# Patient Record
Sex: FEMALE | Race: WHITE | NOT HISPANIC OR LATINO | Employment: FULL TIME | URBAN - METROPOLITAN AREA
[De-identification: names, ages, dates, MRNs, and addresses within clinical notes are randomized per-mention and may not be internally consistent; named-entity substitution may affect disease eponyms.]

---
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
## 2019-05-29 ENCOUNTER — HOSPITAL ENCOUNTER (OUTPATIENT)
Dept: HOSPITAL 8 - RAD | Age: 19
Discharge: HOME | End: 2019-05-29
Attending: PHYSICIAN ASSISTANT
Payer: COMMERCIAL

## 2019-05-29 DIAGNOSIS — R50.9: ICD-10-CM

## 2019-05-29 DIAGNOSIS — R10.11: Primary | ICD-10-CM

## 2019-05-29 DIAGNOSIS — R10.13: ICD-10-CM

## 2019-05-29 PROCEDURE — 76700 US EXAM ABDOM COMPLETE: CPT

## 2020-12-28 ENCOUNTER — OFFICE VISIT (OUTPATIENT)
Dept: URGENT CARE | Facility: PHYSICIAN GROUP | Age: 20
End: 2020-12-28
Payer: COMMERCIAL

## 2020-12-28 ENCOUNTER — HOSPITAL ENCOUNTER (OUTPATIENT)
Facility: MEDICAL CENTER | Age: 20
End: 2020-12-28
Attending: PHYSICIAN ASSISTANT
Payer: COMMERCIAL

## 2020-12-28 VITALS
TEMPERATURE: 99 F | OXYGEN SATURATION: 98 % | HEIGHT: 65 IN | SYSTOLIC BLOOD PRESSURE: 112 MMHG | HEART RATE: 95 BPM | RESPIRATION RATE: 16 BRPM | DIASTOLIC BLOOD PRESSURE: 70 MMHG | BODY MASS INDEX: 28.99 KG/M2 | WEIGHT: 174 LBS

## 2020-12-28 DIAGNOSIS — J06.9 UPPER RESPIRATORY TRACT INFECTION, UNSPECIFIED TYPE: ICD-10-CM

## 2020-12-28 LAB
HETEROPH AB SER QL LA: NORMAL
INT CON NEG: NEGATIVE
INT CON NEG: NEGATIVE
INT CON POS: POSITIVE
INT CON POS: POSITIVE
S PYO AG THROAT QL: NORMAL

## 2020-12-28 PROCEDURE — 87880 STREP A ASSAY W/OPTIC: CPT | Performed by: PHYSICIAN ASSISTANT

## 2020-12-28 PROCEDURE — U0003 INFECTIOUS AGENT DETECTION BY NUCLEIC ACID (DNA OR RNA); SEVERE ACUTE RESPIRATORY SYNDROME CORONAVIRUS 2 (SARS-COV-2) (CORONAVIRUS DISEASE [COVID-19]), AMPLIFIED PROBE TECHNIQUE, MAKING USE OF HIGH THROUGHPUT TECHNOLOGIES AS DESCRIBED BY CMS-2020-01-R: HCPCS

## 2020-12-28 PROCEDURE — 86308 HETEROPHILE ANTIBODY SCREEN: CPT | Performed by: PHYSICIAN ASSISTANT

## 2020-12-28 PROCEDURE — 99214 OFFICE O/P EST MOD 30 MIN: CPT | Performed by: PHYSICIAN ASSISTANT

## 2020-12-28 RX ORDER — ESCITALOPRAM OXALATE 10 MG/1
10 TABLET ORAL DAILY
COMMUNITY
End: 2021-04-06

## 2020-12-28 ASSESSMENT — ENCOUNTER SYMPTOMS
MYALGIAS: 0
SWEATS: 0
COUGH: 1
DIARRHEA: 0
SENSORY CHANGE: 0
RHINORRHEA: 1
SINUS PAIN: 0
FOCAL WEAKNESS: 0
HEMOPTYSIS: 0
VOMITING: 1
FEVER: 1
PALPITATIONS: 0
ABDOMINAL PAIN: 0
HEADACHES: 0
TINGLING: 0
SHORTNESS OF BREATH: 0
WHEEZING: 0
SPUTUM PRODUCTION: 0
CHILLS: 1
NAUSEA: 1
SORE THROAT: 1

## 2020-12-28 NOTE — PROGRESS NOTES
"Subjective:      Zhanna Edmondson is a 20 y.o. female who presents with Sore Throat (runny nose, sneezing coughing x5 days)            Cough  This is a new problem. The current episode started in the past 7 days (5 days). The problem has been unchanged. The cough is non-productive. Associated symptoms include chills, a fever (low-grade), nasal congestion, rhinorrhea and a sore throat. Pertinent negatives include no chest pain, ear congestion, ear pain, headaches, hemoptysis, myalgias, postnasal drip, rash, shortness of breath, sweats or wheezing. She has tried nothing for the symptoms. There is no history of asthma or pneumonia.       No past medical history on file.    No past surgical history on file.      No family history on file.    No Known Allergies    Medications, Allergies, and current problem list reviewed today in Epic      Review of Systems   Constitutional: Positive for chills, fever (low-grade) and malaise/fatigue.   HENT: Positive for congestion, rhinorrhea and sore throat. Negative for ear discharge, ear pain, postnasal drip and sinus pain.    Respiratory: Positive for cough. Negative for hemoptysis, sputum production, shortness of breath and wheezing.    Cardiovascular: Negative for chest pain, palpitations and leg swelling.   Gastrointestinal: Positive for nausea and vomiting. Negative for abdominal pain and diarrhea.   Musculoskeletal: Negative for myalgias.   Skin: Negative for rash.   Neurological: Negative for tingling, sensory change, focal weakness and headaches.     All other systems reviewed and are negative.        Objective:     /70 (BP Location: Left arm, Patient Position: Sitting, BP Cuff Size: Adult)   Pulse 95   Temp 37.2 °C (99 °F) (Temporal)   Resp 16   Ht 1.651 m (5' 5\")   Wt 78.9 kg (174 lb)   LMP 12/07/2020   SpO2 98%   BMI 28.96 kg/m²      Physical Exam  Constitutional:       General: She is not in acute distress.     Appearance: She is not ill-appearing.   HENT:     "  Head: Normocephalic and atraumatic.      Nose: Congestion and rhinorrhea present.      Mouth/Throat:      Mouth: Mucous membranes are moist.      Pharynx: Oropharyngeal exudate and posterior oropharyngeal erythema present.   Eyes:      Conjunctiva/sclera: Conjunctivae normal.   Cardiovascular:      Rate and Rhythm: Normal rate and regular rhythm.      Heart sounds: Normal heart sounds. No murmur. No friction rub. No gallop.    Pulmonary:      Effort: Pulmonary effort is normal. No respiratory distress.      Breath sounds: Normal breath sounds. No wheezing, rhonchi or rales.   Musculoskeletal: Normal range of motion.   Lymphadenopathy:      Cervical: Cervical adenopathy present.   Skin:     General: Skin is warm and dry.      Findings: No rash.   Neurological:      General: No focal deficit present.      Mental Status: She is alert and oriented to person, place, and time.   Psychiatric:         Mood and Affect: Mood normal.         Behavior: Behavior normal.         Thought Content: Thought content normal.         Judgment: Judgment normal.                 Assessment/Plan:        1. Upper respiratory tract infection, unspecified type  COVID/SARS COV-2 PCR    POCT Rapid Strep A    POCT Mononucleosis (mono)       poct strep-negative  poct mono- negative     COVID pending.   Isolation guidelines, conservative measures and ER precautions discussed.   COVID handout given.    Differential diagnoses, Supportive care, and indications for immediate follow-up discussed with patient.   Pathogenesis of diagnosis discussed including typical length and natural progression.   Instructed to return to clinic or nearest emergency department for any change in condition, further concerns, or worsening of symptoms.    The patient demonstrated a good understanding and agreed with the treatment plan.    Ashlee Walker P.A.-C.

## 2020-12-29 DIAGNOSIS — J06.9 UPPER RESPIRATORY TRACT INFECTION, UNSPECIFIED TYPE: ICD-10-CM

## 2020-12-29 LAB
COVID ORDER STATUS COVID19: NORMAL
SARS-COV-2 RNA RESP QL NAA+PROBE: NOTDETECTED
SPECIMEN SOURCE: NORMAL

## 2020-12-31 ENCOUNTER — HOSPITAL ENCOUNTER (OUTPATIENT)
Facility: MEDICAL CENTER | Age: 20
End: 2020-12-31
Attending: PHYSICIAN ASSISTANT
Payer: COMMERCIAL

## 2020-12-31 ENCOUNTER — OFFICE VISIT (OUTPATIENT)
Dept: URGENT CARE | Facility: PHYSICIAN GROUP | Age: 20
End: 2020-12-31
Payer: COMMERCIAL

## 2020-12-31 VITALS
WEIGHT: 170 LBS | SYSTOLIC BLOOD PRESSURE: 120 MMHG | TEMPERATURE: 98.6 F | RESPIRATION RATE: 16 BRPM | OXYGEN SATURATION: 97 % | HEART RATE: 94 BPM | DIASTOLIC BLOOD PRESSURE: 80 MMHG | HEIGHT: 65 IN | BODY MASS INDEX: 28.32 KG/M2

## 2020-12-31 DIAGNOSIS — R50.9 FEVER, UNSPECIFIED FEVER CAUSE: ICD-10-CM

## 2020-12-31 DIAGNOSIS — I88.9 CERVICAL ADENITIS: ICD-10-CM

## 2020-12-31 DIAGNOSIS — J02.9 SORE THROAT: ICD-10-CM

## 2020-12-31 DIAGNOSIS — J03.90 EXUDATIVE TONSILLITIS: Primary | ICD-10-CM

## 2020-12-31 LAB
HETEROPH AB SER QL LA: NEGATIVE
INT CON NEG: NEGATIVE
INT CON NEG: NEGATIVE
INT CON POS: POSITIVE
INT CON POS: POSITIVE
S PYO AG THROAT QL: NEGATIVE

## 2020-12-31 PROCEDURE — 87077 CULTURE AEROBIC IDENTIFY: CPT

## 2020-12-31 PROCEDURE — 87070 CULTURE OTHR SPECIMN AEROBIC: CPT

## 2020-12-31 PROCEDURE — 86308 HETEROPHILE ANTIBODY SCREEN: CPT | Performed by: PHYSICIAN ASSISTANT

## 2020-12-31 PROCEDURE — 99214 OFFICE O/P EST MOD 30 MIN: CPT | Performed by: PHYSICIAN ASSISTANT

## 2020-12-31 PROCEDURE — 87880 STREP A ASSAY W/OPTIC: CPT | Performed by: PHYSICIAN ASSISTANT

## 2020-12-31 RX ORDER — AMOXICILLIN 875 MG/1
875 TABLET, COATED ORAL 2 TIMES DAILY
Qty: 20 TAB | Refills: 0 | Status: SHIPPED | OUTPATIENT
Start: 2020-12-31 | End: 2021-03-03

## 2020-12-31 RX ORDER — ACETAMINOPHEN 325 MG/1
650 TABLET ORAL EVERY 4 HOURS PRN
COMMUNITY
End: 2021-02-03

## 2020-12-31 ASSESSMENT — ENCOUNTER SYMPTOMS
COUGH: 0
TROUBLE SWALLOWING: 1
EYE DISCHARGE: 0
DIARRHEA: 0
NAUSEA: 0
VOMITING: 0
SWOLLEN GLANDS: 1
MUSCULOSKELETAL NEGATIVE: 1
STRIDOR: 0
CHILLS: 0
FEVER: 1
SORE THROAT: 1
HEADACHES: 0
DIZZINESS: 0
SHORTNESS OF BREATH: 0

## 2020-12-31 NOTE — LETTER
December 31, 2020         Patient: Zhanna Edmondson   YOB: 2000   Date of Visit: 12/31/2020           To Whom it May Concern:    Zhanna Edmondson was seen in my clinic on 12/31/2020. She {Return to school/sport/work:75651}    If you have any questions or concerns, please don't hesitate to call.        Sincerely,           Zeynep Flores P.A.-C.  Electronically Signed

## 2020-12-31 NOTE — PROGRESS NOTES
Subjective:      Zhanna Edmondson is a 20 y.o. female who presents with Pharyngitis (Fever, hard to swallow )    Medications:    • acetaminophen Tabs  • escitalopram Tabs    Allergies: Patient has no known allergies.    Problem List: Zhanna Edmondson does not have a problem list on file.    Surgical History:  No past surgical history on file.    Past Social Hx: Zhanna Edmondson  reports that she has never smoked. She has never used smokeless tobacco.     Past Family Hx:  Zhanna Edmondson family history is not on file. Not pertinent to today's visit.       Problem list, medications, and allergies reviewed by myself today in Epic.             Patient presents with:  Pharyngitis: Fever, hard to swallow .  Patient complains of worsening tonsil swelling, increase in white pus on tonsils bilaterally.  Patient states fever T-max 102 yesterday.  Patient had a negative Covid negative strep negative mono a few days ago but states she was told to come back if her symptoms got worse which they are.  Patient denies rash, nausea vomiting or diarrhea.  Patient is taking over-the-counter NSAIDs and gargling with salt water with a little relief.    Negative covid test 12/28/2020.    Pharyngitis   This is a new problem. The current episode started in the past 7 days. The problem has been gradually worsening. Neither side of throat is experiencing more pain than the other. The maximum temperature recorded prior to her arrival was 102 - 102.9 F. The fever has been present for 5 days or more. The pain is at a severity of 7/10. Associated symptoms include a plugged ear sensation, swollen glands and trouble swallowing. Pertinent negatives include no congestion, coughing, diarrhea, drooling, headaches, shortness of breath, stridor or vomiting. She has tried cool liquids, gargles and NSAIDs for the symptoms. The treatment provided mild relief.       Review of Systems   Constitutional: Positive for fever and malaise/fatigue. Negative for chills.  "  HENT: Positive for sore throat and trouble swallowing. Negative for congestion and drooling.    Eyes: Negative for discharge.   Respiratory: Negative for cough, shortness of breath and stridor.    Cardiovascular: Negative for chest pain.   Gastrointestinal: Negative for diarrhea, nausea and vomiting.   Musculoskeletal: Negative.    Skin: Negative for rash.   Neurological: Negative for dizziness and headaches.   All other systems reviewed and are negative.         Objective:     /80 (BP Location: Right arm, Patient Position: Sitting, BP Cuff Size: Adult)   Pulse 94   Temp 37 °C (98.6 °F) (Temporal)   Resp 16   Ht 1.651 m (5' 5\")   Wt 77.1 kg (170 lb)   LMP 12/07/2020   SpO2 97%   BMI 28.29 kg/m²      Physical Exam  Vitals signs and nursing note reviewed.   Constitutional:       General: She is not in acute distress.     Appearance: Normal appearance. She is well-developed and normal weight. She is not ill-appearing or toxic-appearing.   HENT:      Head: Normocephalic and atraumatic.      Right Ear: Tympanic membrane normal.      Left Ear: Tympanic membrane normal.      Nose: Nose normal.      Mouth/Throat:      Lips: Pink.      Mouth: Mucous membranes are moist.      Pharynx: Uvula midline. Posterior oropharyngeal erythema present. No uvula swelling.      Tonsils: Tonsillar exudate present. 3+ on the right. 3+ on the left.   Eyes:      Extraocular Movements: Extraocular movements intact.      Conjunctiva/sclera: Conjunctivae normal.      Pupils: Pupils are equal, round, and reactive to light.   Neck:      Musculoskeletal: Normal range of motion and neck supple.   Cardiovascular:      Rate and Rhythm: Normal rate and regular rhythm.      Heart sounds: Normal heart sounds.   Pulmonary:      Effort: Pulmonary effort is normal.      Breath sounds: Normal breath sounds.   Abdominal:      Palpations: Abdomen is soft.   Musculoskeletal: Normal range of motion.   Lymphadenopathy:      Cervical: No cervical " adenopathy.   Skin:     General: Skin is warm and dry.      Capillary Refill: Capillary refill takes less than 2 seconds.   Neurological:      General: No focal deficit present.      Mental Status: She is alert and oriented to person, place, and time.      Gait: Gait normal.   Psychiatric:         Mood and Affect: Mood normal.         Behavior: Behavior is cooperative.            Strep: Negative    Mono: Negative     Assessment/Plan:           1. Exudative tonsillitis  amoxicillin (AMOXIL) 875 MG tablet   2. Fever, unspecified fever cause  CULTURE THROAT    POCT Mononucleosis (mono)    amoxicillin (AMOXIL) 875 MG tablet   3. Sore throat  POCT Rapid Strep A    CULTURE THROAT    POCT Mononucleosis (mono)    amoxicillin (AMOXIL) 875 MG tablet   4. Cervical adenitis  amoxicillin (AMOXIL) 875 MG tablet     Patient was evaluated in clinic today while wearing appropriate personal protective equipment.      PT can begin or continue OTC medications, increase fluids and rest until symptoms improve.     PT to begin medications today as prescribed.  We will treat for exudative tonsillitis, as well as cervical adenitis.  Patient has had persistent swelling of tonsils with exudate and fever, negative strep and mono.    For this reason I am running a throat culture to look for group B C or G strep.    PT should follow up with PCP in 1-2 days for re-evaluation if symptoms have not improved.  Discussed red flags and reasons to return to UC or ED.  Pt and/or family verbalized understanding of diagnosis and follow up instructions and was offered informational handout on diagnosis.  PT discharged.

## 2020-12-31 NOTE — LETTER
December 31, 2020         Patient: Zhanna Edmondson   YOB: 2000   Date of Visit: 12/31/2020           To Whom it May Concern:    Zhanna Edmondson was seen in my clinic on 12/31/2020. She may return to work on 01/04/2021.     If you have any questions or concerns, please don't hesitate to call.        Sincerely,           Zeynep Flores P.A.-C.  Electronically Signed

## 2021-01-03 LAB
BACTERIA SPEC RESP CULT: ABNORMAL
BACTERIA SPEC RESP CULT: ABNORMAL
SIGNIFICANT IND 70042: ABNORMAL
SITE SITE: ABNORMAL
SOURCE SOURCE: ABNORMAL

## 2021-02-03 ENCOUNTER — HOSPITAL ENCOUNTER (EMERGENCY)
Facility: MEDICAL CENTER | Age: 21
End: 2021-02-03
Attending: EMERGENCY MEDICINE
Payer: COMMERCIAL

## 2021-02-03 VITALS
DIASTOLIC BLOOD PRESSURE: 82 MMHG | RESPIRATION RATE: 16 BRPM | HEIGHT: 65 IN | HEART RATE: 84 BPM | TEMPERATURE: 97.2 F | OXYGEN SATURATION: 99 % | SYSTOLIC BLOOD PRESSURE: 118 MMHG | WEIGHT: 189.38 LBS | BODY MASS INDEX: 31.55 KG/M2

## 2021-02-03 DIAGNOSIS — S61.452A DOG BITE OF LEFT HAND, INITIAL ENCOUNTER: ICD-10-CM
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
## 2021-02-03 PROCEDURE — 90715 TDAP VACCINE 7 YRS/> IM: CPT | Performed by: EMERGENCY MEDICINE

## 2021-02-03 PROCEDURE — 700111 HCHG RX REV CODE 636 W/ 250 OVERRIDE (IP): Performed by: EMERGENCY MEDICINE

## 2021-02-03 PROCEDURE — 90471 IMMUNIZATION ADMIN: CPT

## 2021-02-03 PROCEDURE — 99283 EMERGENCY DEPT VISIT LOW MDM: CPT

## 2021-02-03 RX ORDER — AMOXICILLIN AND CLAVULANATE POTASSIUM 875; 125 MG/1; MG/1
1 TABLET, FILM COATED ORAL 2 TIMES DAILY
Qty: 14 TAB | Refills: 0 | Status: SHIPPED | OUTPATIENT
Start: 2021-02-03 | End: 2021-02-10

## 2021-02-03 RX ORDER — IBUPROFEN 200 MG
400 TABLET ORAL EVERY 6 HOURS PRN
COMMUNITY
End: 2021-03-03

## 2021-02-03 RX ADMIN — CLOSTRIDIUM TETANI TOXOID ANTIGEN (FORMALDEHYDE INACTIVATED), CORYNEBACTERIUM DIPHTHERIAE TOXOID ANTIGEN (FORMALDEHYDE INACTIVATED), BORDETELLA PERTUSSIS TOXOID ANTIGEN (GLUTARALDEHYDE INACTIVATED), BORDETELLA PERTUSSIS FILAMENTOUS HEMAGGLUTININ ANTIGEN (FORMALDEHYDE INACTIVATED), BORDETELLA PERTUSSIS PERTACTIN ANTIGEN, AND BORDETELLA PERTUSSIS FIMBRIAE 2/3 ANTIGEN 0.5 ML: 5; 2; 2.5; 5; 3; 5 INJECTION, SUSPENSION INTRAMUSCULAR at 10:28

## 2021-02-03 SDOH — HEALTH STABILITY: MENTAL HEALTH: HOW OFTEN DO YOU HAVE A DRINK CONTAINING ALCOHOL?: MONTHLY OR LESS

## 2021-02-03 NOTE — ED NOTES
ERP at bedside. Pt agrees with plan of care discussed by ERP. AIDET acknowledged with patient. Regino in low position, side rail up for pt safety. Call light within reach. Will continue to monitor.

## 2021-02-03 NOTE — LETTER
"  FORM C-4:  EMPLOYEE’S CLAIM FOR COMPENSATION/ REPORT OF INITIAL TREATMENT  EMPLOYEE’S CLAIM - PROVIDE ALL INFORMATION REQUESTED   First Name Zhanna Last Name Delvis Birthdate 2000  Sex female Claim Number   Home Address [address]             [place] [address]                                   Age  20 y.o. Height  1.651 m (5' 5\") Weight  85.9 kg (189 lb 6 oz) [facility]  [id number]   Mailing Address [address]              Zip [address] Telephone  [phone] (home)  Primary Language Spoken   Insurer  *** Third Party   [employer] Employee's Occupation (Job Title) When Injury or Occupational Disease Occurred     Employer's Name  Telephone     Employer Address  City  State  Zip    Date of Injury         Hour of Injury   Date Employer Notified   Last Day of Work after Injury or Occupational Disease   Supervisor to Whom Injury Reported     Address or Location of Accident (if applicable) [[address]]   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 (if applicable)     How did this injury or occupational disease occur? Be specific and answer in detail. Use additional sheet if necessary)     If you believe that you have an occupational disease, when did you first have knowledge of the disability and it relationship to your employment?  Witnesses to the Accident     Nature of Injury or Occupational Disease   Part(s) of Body Injured or Affected  , ,     I CERTIFY THAT THE ABOVE IS TRUE AND CORRECT TO THE BEST OF MY KNOWLEDGE AND THAT I HAVE PROVIDED THIS INFORMATION IN ORDER TO OBTAIN THE B[place]EF[place] OF [place]).  I HEREBY AUTHORIZE ANY PHYSICIAN, CHIROPRACTOR, SURGEON, PRACTITIONER, OR OTHER PERSON, ANY HOSPITAL, INCLUDING [facility] OR [facility], ANY MEDICAL SERVICE ORGANIZATION, ANY INSURANCE COMPANY, OR OTHER INSTITUTION OR " ORGANIZATION TO RELEASE TO EACH OTHER, ANY MEDICAL OR OTHER INFORMATION, INCLUDING BENEFITS PAID OR PAYABLE, PERTINENT TO THIS INJURY OR DISEASE, EXCEPT INFORMATION RELATIVE TO DIAGNOSIS, TREATMENT AND/OR COUNSELING FOR AIDS, PSYCHOLOGICAL CONDITIONS, ALCOHOL OR CONTROLLED SUBSTANCES, FOR WHICH I MUST GIVE SPECIFIC AUTHORIZATION.  A PHOTOSTAT OF THIS AUTHORIZATION SHALL BE AS VALID AS THE ORIGINAL.  Date                                      Place                                                                             Employee’s Signature   THIS REPORT MUST BE COMPLETED AND MAILED WITHIN 3 WORKING DAYS OF TREATMENT   Place [facility], EMERGENCY DEPT                       Name of Facility [facility]   Date  2/3/2021 Diagnosis  (S61.452A,  W54.0XXA) Dog bite of left hand, initial encounter Is there evidence the injured employee was under the influence of alcohol and/or another controlled substance at the time of accident?   Hour  10:19 AM Description of Injury or Disease  Dog bite of left hand, initial encounter     Treatment     Have you advised the patient to remain off work five days or more?             X-Ray Findings    If Yes   From Date    To Date      From information given by the employee, together with medical evidence, can you directly connect this injury or occupational disease as job incurred?   If No, is employee capable of: Full Duty    Modified Duty      Is additional medical care by a physician indicated?   If Modified Duty, Specify any Limitations / Restrictions       Do you know of any previous injury or disease contributing to this condition or occupational disease?      Date 2/3/2021 Print Doctor’s Name Kayy Benjamin I certify the employer’s copy of this form was mailed on:   Address [address]  [place] [address]  [phone] INSURER’S USE ONLY   Provider’s Tax ID Number   Telephone Dept: [phone]    Doctor’s Signature    "Degree        Form C-4 (rev.10/07)                                                                         BRIEF DESCRIPTION OF RIGHTS AND BENEFITS  ([place]ursuant to [place] 616[place].050)    Notice of Injury or Occupational Disease (Incident Report Form C-1): If an injury or occupational disease (OD) arises out of and in the course of employment, you must provide written notice to your employer as soon as practicable, but no later than 7 days after the accident or OD. Your employer shall maintain a sufficient supply of the required forms.    Claim for Compensation (Form C-4): If medical treatment is sought, the form C-4 is available at the place of initial treatment. A completed \"Claim for Compensation\" (Form C-4) must be filed within 90 days after an accident or OD. The treating physician or chiropractor must, within 3 working days after treatment, complete and mail to the employer, the employer's insurer and third-party , the Claim for Compensation.    Medical Treatment: If you require medical treatment for your on-the-job injury or OD, you may be required to select a physician or chiropractor from a list provided by your workers’ compensation insurer, if it has contracted with an Organization for Managed Care (MCO) or Preferred Provider Organization (PPO) or providers of health care. If your employer has not entered into a contract with an MCO or PPO, you may select a physician or chiropractor from the Panel of Physicians and Chiropractors. Any medical costs related to your industrial injury or OD will be paid by your insurer.    Temporary Total Disability (TTD): If your doctor has certified that you are unable to work for a period of at least 5 consecutive days, or 5 cumulative days in a 20-day period, or places restrictions on you that your employer does not accommodate, you may be entitled to TTD compensation.    Temporary Partial Disability (TPD): If the wage you receive upon reemployment is less than the " compensation for TTD to which you are entitled, the insurer may be required to pay you TPD compensation to make up the difference. TPD can only be paid for a maximum of 24 months.    Permanent Partial Disability (PPD): When your medical condition is stable and there is an indication of a PPD as a result of your injury or OD, within 30 days, your insurer must arrange for an evaluation by a rating physician or chiropractor to determine the degree of your PPD. The amount of your PPD award depends on the date of injury, the results of the PPD evaluation, your age and wage.    Permanent Total Disability (PTD): If you are medically certified by a treating physician or chiropractor as permanently and totally disabled and have been granted a PTD status by your insurer, you are entitled to receive monthly benefits not to exceed 66 2/3% of your average monthly wage. The amount of your PTD payments is subject to reduction if you previously received a lump-sum PPD award.    Vocational Rehabilitation Services: You may be eligible for vocational rehabilitation services if you are unable to return to the job due to a permanent physical impairment or permanent restrictions as a result of your injury or occupational disease.    Transportation and Per Oj Reimbursement: You may be eligible for travel expenses and per oj associated with medical treatment.    Reopening: You may be able to reopen your claim if your condition worsens after claim closure.     Appeal Process: If you disagree with a written determination issued by the insurer or the insurer does not respond to your request, you may appeal to the Department of Administration, , by following the instructions contained in your determination letter. You must appeal the determination within 70 days from the date of the determination letter at [address], [address], [place], [place] [address], or [address], [place], [place]  [address]. If you disagree with the  decision, you may appeal to the Department of Administration, . You must file your appeal within 30 days from the date of the  decision letter at [address], [address], [place], [place] [address], or [address], [place], [place] [address]. If you disagree with a decision of an , you may file a petition for judicial review with the District Court. You must do so within 30 days of the Appeal Officer’s decision. You may be represented by an  at your own expense or you may contact the [facility] for possible representation.    [place]  for Injured Workers (NAIW): If you disagree with a  decision, you may request that NAIW represent you without charge at an  Hearing. For information regarding denial of benefits, you may contact the [facility] at: [address], [address], [place] [address], [phone], or [address], [place] [address], [phone]    To File a Complaint with the Division: If you wish to file a complaint with the  of the Division of Industrial Relations (DIR),  please contact the Workers’ Compensation Section, [address], [address], [place], [place] [address], telephone (415) [phone], or [address], [address], [place], [place] [address], telephone [phone].    For assistance with Workers’ Compensation Issues: You may contact the [facility] Office for Consumer Health Assistance, [address], [address], [place], [place] [address], Toll Free 1-743.988.8648, Web site: http://Count includes the [facility].[place].gov/Programs/FERDINAND E-mail: ferdinand@[facility].[place].gov  D-2 (rev. 10/20)              __________________________________________________________________                                    _________________            Employee Name / Signature                                                                                                                             Date

## 2021-02-03 NOTE — LETTER
"  FORM C-4:  EMPLOYEE’S CLAIM FOR COMPENSATION/ REPORT OF INITIAL TREATMENT  EMPLOYEE’S CLAIM - PROVIDE ALL INFORMATION REQUESTED   First Name Zhanna Last Name Delvis Birthdate 2000  Sex female Claim Number   Home Address [address]             Zip [address]                                   Age  20 y.o. Height  1.651 m (5' 5\") Weight  85.9 kg (189 lb 6 oz) [facility]     Mailing Address [address]              Zip [address] Telephone  [phone] (home)  Primary Language Spoken   Insurer   Third Party   [employer] Employee's Occupation (Job Title) When Injury or Occupational Disease Occurred     Employer's Name [employer] Telephone [phone]    Employer Address [address] [place] [address] [place] [address]   Date of Injury  2/3/2021       Hour of Injury  8:40 AM Date Employer Notified  2/3/2021 Last Day of Work after Injury or Occupational Disease  2/3/2021 Supervisor to Whom Injury Reported  Amanda   Address or Location of Accident (if applicable) [[address] [place] [address]]   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 (if applicable) Holding dog for sedation    How did this injury or occupational disease occur? Be specific and answer in detail. Use additional sheet if necessary)  muzzle slipped off fractious dog. Coworker didn't allow time to move or re-muzzle, dog reached arounf and bit. There's 3 puncture wounds.   If you believe that you have an occupational disease, when did you first have knowledge of the disability and it relationship to your employment? n/a Witnesses to the Accident  Kely Rumpel   Nature of Injury or Occupational Disease  Workers' Compensation Part(s) of Body Injured or Affected  Hand (L), N/A, N/A    I CERTIFY THAT THE ABOVE IS TRUE AND CORRECT TO THE BEST OF MY KNOWLEDGE AND THAT I HAVE PROVIDED THIS INFORMATION IN ORDER TO OBTAIN THE [place]EF[place], [place]  I HEREBY AUTHORIZE ANY PHYSICIAN, CHIROPRACTOR, SURGEON, PRACTITIONER, OR OTHER PERSON, ANY HOSPITAL, INCLUDING [facility] OR [facility], ANY MEDICAL SERVICE ORGANIZATION, ANY INSURANCE COMPANY, OR OTHER INSTITUTION OR ORGANIZATION TO RELEASE TO EACH OTHER, ANY MEDICAL OR OTHER INFORMATION, INCLUDING BENEFITS PAID OR PAYABLE, PERTINENT TO THIS INJURY OR DISEASE, EXCEPT INFORMATION RELATIVE TO DIAGNOSIS, TREATMENT AND/OR COUNSELING FOR AIDS, PSYCHOLOGICAL CONDITIONS, ALCOHOL OR CONTROLLED SUBSTANCES, FOR WHICH I MUST GIVE SPECIFIC AUTHORIZATION.  A PHOTOSTAT OF THIS AUTHORIZATION SHALL BE AS VALID AS THE ORIGINAL.  Date    02/03/2021                      Place [facility]                                              Employee’s Signature   THIS REPORT MUST BE COMPLETED AND MAILED WITHIN 3 WORKING DAYS OF TREATMENT   Place [facility], EMERGENCY DEPT                       Name of Facility [facility]   Date  2/3/2021 Diagnosis  (S61.452A,  W54.0XXA) Dog bite of left hand, initial encounter Is there evidence the injured employee was under the influence of alcohol and/or another controlled substance at the time of accident?   Hour  11:00 AM Description of Injury or Disease  Dog bite of left hand, initial encounter No   Treatment  Antibiotics, sling, tatanus  Have you advised the patient to remain off work five days or more?         No   X-Ray Findings  Negative  Comments:not done If Yes   From Date    To Date      From information given by the employee, together with medical evidence, can you directly connect this injury or occupational disease as job incurred? Yes If No, is employee capable of: Full Duty  No Modified Duty  Yes   Is additional medical care by a physician indicated? Yes If Modified Duty, Specify any Limitations / Restrictions   No use  "of hand until healed   Do you know of any previous injury or disease contributing to this condition or occupational disease? No    Date 2/3/2021 Print Doctor’s Name Eva Benjamin I certify the employer’s copy of this form was mailed on:   Address [address] [place] [address] [phone] INSURER’S USE ONLY   Provider’s Tax ID Number   Telephone Dept: [phone]    Doctor’s Signature e-EVA Brewer M.D. Degree  M.D      Form C-4 (rev.10/07)                                                                         BRIEF DESCRIPTION OF RIGHTS AND BENEFITS  ([place] to [place] 616C.050)    Notice of Injury or Occupational Disease (Incident Report Form C-1): If an injury or occupational disease (OD) arises out of and in the course of employment, you must provide written notice to your employer as soon as practicable, but no later than 7 days after the accident or OD. Your employer shall maintain a sufficient supply of the required forms.    Claim for Compensation (Form C-4): If medical treatment is sought, the form C-4 is available at the place of initial treatment. A completed \"Claim for Compensation\" (Form C-4) must be filed within 90 days after an accident or OD. The treating physician or chiropractor must, within 3 working days after treatment, complete and mail to the employer, the employer's insurer and third-party , the Claim for Compensation.    Medical Treatment: If you require medical treatment for your on-the-job injury or OD, you may be required to select a physician or chiropractor from a list provided by your workers’ compensation insurer, if it has contracted with an Organization for Managed Care (MCO) or Preferred Provider Organization (PPO) or providers of health care. If your employer has not entered into a contract with an MCO or PPO, you may select a physician or chiropractor from the Panel of Physicians and Chiropractors. Any medical costs related to your " industrial injury or OD will be paid by your insurer.    Temporary Total Disability (TTD): If your doctor has certified that you are unable to work for a period of at least 5 consecutive days, or 5 cumulative days in a 20-day period, or places restrictions on you that your employer does not accommodate, you may be entitled to TTD compensation.    Temporary Partial Disability (TPD): If the wage you receive upon reemployment is less than the compensation for TTD to which you are entitled, the insurer may be required to pay you TPD compensation to make up the difference. TPD can only be paid for a maximum of 24 months.    Permanent Partial Disability (PPD): When your medical condition is stable and there is an indication of a PPD as a result of your injury or OD, within 30 days, your insurer must arrange for an evaluation by a rating physician or chiropractor to determine the degree of your PPD. The amount of your PPD award depends on the date of injury, the results of the PPD evaluation, your age and wage.    Permanent Total Disability (PTD): If you are medically certified by a treating physician or chiropractor as permanently and totally disabled and have been granted a PTD status by your insurer, you are entitled to receive monthly benefits not to exceed 66 2/3% of your average monthly wage. The amount of your PTD payments is subject to reduction if you previously received a lump-sum PPD award.    Vocational Rehabilitation Services: You may be eligible for vocational rehabilitation services if you are unable to return to the job due to a permanent physical impairment or permanent restrictions as a result of your injury or occupational disease.    Transportation and Per Oj Reimbursement: You may be eligible for travel expenses and per oj associated with medical treatment.    Reopening: You may be able to reopen your claim if your condition worsens after claim closure.     Appeal Process: If you disagree with a  written determination issued by the insurer or the insurer does not respond to your request, you may appeal to the Department of Administration, , by following the instructions contained in your determination letter. You must appeal the determination within 70 days from the date of the determination letter at [address], [address], [place], [place] [address], or [address], [address], [place], [place] [address]. If you disagree with the  decision, you may appeal to the Department of Administration, . You must file your appeal within 30 days from the date of the  decision letter at [address], [address], [place], [place] [address], or [address], [address], [place], [place] [address]. If you disagree with a decision of an , you may file a petition for judicial review with the District Court. You must do so within 30 days of the Appeal Officer’s decision. You may be represented by an  at your own expense or you may contact the [facility] for possible representation.    [place]  for In[place] ([place]IW): If you disagree with a  decision, you may request that NAIW represent you without charge at an  Hearing. For information regarding denial of benefits, you may contact the [facility] at: [address], [address], [place] [address], [phone], or [address], [address], [place] [address], [phone]    To File a Complaint with the Division: If you wish to file a complaint with the  of the Division of Industrial Relations (DIR),  please contact the Workers’ Compensation Section, [address], [place], [place] [address], telephone (500) 438-5905, or [address], [place], [place] [address], telephone [phone].    For assistance with Workers’ Compensation Issues: You may contact the [facility]  [place] Office for Consumer Health Assistance, [address] [place] [address], [address], [place] [address], Toll Free 1-385.111.7548, Web site: http://[facility].[place].gov/Programs/FERDINAND E-mail: ferdinand@[email or web address].[place].gov  D-2 (rev. 10/20)              __________________________________________________________________                                    ____02/03/2021_____________            Employee Name / Signature                                                                                                                            Date

## 2021-02-03 NOTE — ED PROVIDER NOTES
ED Provider Note    CHIEF COMPLAINT  Chief Complaint   Patient presents with   • Dog Bite     L hand with 3 wounds. 2 on palmar surface and one on dorsum of hand while at work       HPI  Zhanna Edmondson is a 20 y.o. female who presents to the emergency department for evaluation of a dog bite.  She works with a .  They were attempting to sedate an animal, dog, for surgery.  The dog was muscled, got out of the muscle and turned his head and bit Magin in the left hand.  She immediately washed the puncture wounds 2 on the palmar surface and one on the dorsum with chlorhexidine irrigated placed antibiotic ointment and wrapped the hand.  She states that the rabies shots are not up-to-date on the animal but the animal was exhibiting no evidence of rabies-like behavior.  She does not know when her last tetanus shot was.  She denies any other acute symptoms.    REVIEW OF SYSTEMS  Positive for puncture wounds negative for active bleeding, bony pain difficulty moving her hand  PAST MEDICAL HISTORY   Denies    SOCIAL HISTORY  Social History     Tobacco Use   • Smoking status: Never Smoker   • Smokeless tobacco: Never Used   Substance and Sexual Activity   • Alcohol use: Yes     Frequency: Monthly or less   • Drug use: Never   • Sexual activity: Not on file       SURGICAL HISTORY  patient denies any surgical history    CURRENT MEDICATIONS  Reviewed.  See Encounter Summary.  Include   Home Medications    Medication Sig Taking? Last Dose Authorizing Provider   Cholecalciferol (D3 VITAMIN PO) Take 3-4 Caps by mouth every 72 hours. Yes 1/31/2021 at PM Physician Outpatient   Multiple Vitamin (MULTI-DAY PO) Take 1 Package by mouth every 72 hours. Yes 1/31/2021 at PM Physician Outpatient   ibuprofen (MOTRIN) 200 MG Tab Take 400 mg by mouth every 6 hours as needed for Mild Pain. Yes 2/3/2021 at 0850 Physician Outpatient   amoxicillin-clavulanate (AUGMENTIN) 875-125 MG Tab Take 1 Tab by mouth 2 times a day for 7 days. Yes   "Kayy Benjamin M.D.   amoxicillin (AMOXIL) 875 MG tablet Take 1 Tab by mouth 2 times a day.  Patient not taking: Reported on 2/3/2021  Not Taking at Unknown time Zeynep Flores P.A.-C.   escitalopram (LEXAPRO) 10 MG Tab Take 10 mg by mouth every day.  2/1/2021 at AM Physician Outpatient         ALLERGIES  No Known Allergies    PHYSICAL EXAM  VITAL SIGNS: /87   Pulse 86   Temp 36.4 °C (97.5 °F) (Temporal)   Resp 16   Ht 1.651 m (5' 5\")   Wt 85.9 kg (189 lb 6 oz)   LMP 02/01/2021   SpO2 98%   Breastfeeding No   BMI 31.51 kg/m²   Constitutional:Alert in no apparent distress.  HENT: Normocephalic, Bilateral external ears normal. Nose normal.   Eyes: Pupils are equal and reactive. Conjunctiva normal, non-icteric.   Thorax & Lungs: Easy unlabored respirations  Abdomen:  No gross signs of peritonitis, no pain with movement   Skin: Visualized skin is  Dry, No erythema, No rash.   Extremities:   No edema, No asymmetry left hand has 2 palmar puncture wounds with no erythema or purulence in one on the dorsum of her left hand.  She has no bony tenderness no crepitus or step-off  Neurologic: Alert, Grossly non-focal.   Psychiatric: Affect and Mood normal      COURSE & MEDICAL DECISION MAKING  Nursing notes and vital signs were reviewed. (See chart for details)    The patient presents to the Emergency Department with dog bite to her hand.  She does not feel like her hand is broken she does not feel like it is infected we discussed rabies observation of the dog and if any clinical symptoms of rabies are manifested she can initiate the rabies vaccination series.  She is comfortable with this plan.  She has filled out a dog bite information form which will be sent.  The patient will be placed on Augmentin for wound prophylaxis she will follow-up with occupational health to make sure there is no signs of infection she knows to watch for increasing erythema.  We have talked about elevating the hand when she can " and she was given a tetanus booster.  The patient knows this is a high risk wound and she will not need to do daily dressing changes and watch it carefully for infection return immediately for signs of infection          FINAL IMPRESSION  1.  Dog bite left hand  2.             Electronically signed by: Kayy Benjamin M.D., 2/3/2021 10:19 AM

## 2021-02-03 NOTE — ED NOTES
Discharge instructions provided.  Pt verbalized the understanding of discharge instructions to follow up with PCP and to return to ER if condition worsens.  Pt ambulated out of ER without difficulty.  rx 1

## 2021-03-03 ENCOUNTER — OFFICE VISIT (OUTPATIENT)
Dept: URGENT CARE | Facility: PHYSICIAN GROUP | Age: 21
End: 2021-03-03
Payer: COMMERCIAL

## 2021-03-03 ENCOUNTER — APPOINTMENT (OUTPATIENT)
Dept: RADIOLOGY | Facility: MEDICAL CENTER | Age: 21
End: 2021-03-03
Attending: EMERGENCY MEDICINE
Payer: COMMERCIAL

## 2021-03-03 ENCOUNTER — HOSPITAL ENCOUNTER (EMERGENCY)
Facility: MEDICAL CENTER | Age: 21
End: 2021-03-03
Attending: EMERGENCY MEDICINE
Payer: COMMERCIAL

## 2021-03-03 VITALS
BODY MASS INDEX: 32.21 KG/M2 | HEART RATE: 85 BPM | TEMPERATURE: 97.9 F | OXYGEN SATURATION: 97 % | WEIGHT: 193.34 LBS | DIASTOLIC BLOOD PRESSURE: 72 MMHG | SYSTOLIC BLOOD PRESSURE: 113 MMHG | HEIGHT: 65 IN | RESPIRATION RATE: 20 BRPM

## 2021-03-03 VITALS
TEMPERATURE: 98.7 F | HEIGHT: 65 IN | WEIGHT: 193 LBS | OXYGEN SATURATION: 96 % | RESPIRATION RATE: 16 BRPM | BODY MASS INDEX: 32.15 KG/M2 | HEART RATE: 98 BPM | SYSTOLIC BLOOD PRESSURE: 122 MMHG | DIASTOLIC BLOOD PRESSURE: 82 MMHG

## 2021-03-03 DIAGNOSIS — R55 SYNCOPE, UNSPECIFIED SYNCOPE TYPE: ICD-10-CM

## 2021-03-03 DIAGNOSIS — R00.2 PALPITATIONS: ICD-10-CM

## 2021-03-03 LAB
ALBUMIN SERPL BCP-MCNC: 4.3 G/DL (ref 3.2–4.9)
ALBUMIN/GLOB SERPL: 1.4 G/DL
ALP SERPL-CCNC: 94 U/L (ref 30–99)
ALT SERPL-CCNC: 15 U/L (ref 2–50)
ANION GAP SERPL CALC-SCNC: 10 MMOL/L (ref 7–16)
AST SERPL-CCNC: 16 U/L (ref 12–45)
BASOPHILS # BLD AUTO: 0.7 % (ref 0–1.8)
BASOPHILS # BLD: 0.06 K/UL (ref 0–0.12)
BILIRUB SERPL-MCNC: 0.2 MG/DL (ref 0.1–1.5)
BUN SERPL-MCNC: 12 MG/DL (ref 8–22)
CALCIUM SERPL-MCNC: 9.6 MG/DL (ref 8.5–10.5)
CHLORIDE SERPL-SCNC: 108 MMOL/L (ref 96–112)
CO2 SERPL-SCNC: 23 MMOL/L (ref 20–33)
CREAT SERPL-MCNC: 0.61 MG/DL (ref 0.5–1.4)
EKG 4674: NORMAL
EKG IMPRESSION: NORMAL
EOSINOPHIL # BLD AUTO: 0.37 K/UL (ref 0–0.51)
EOSINOPHIL NFR BLD: 4.4 % (ref 0–6.9)
ERYTHROCYTE [DISTWIDTH] IN BLOOD BY AUTOMATED COUNT: 43.3 FL (ref 35.9–50)
GLOBULIN SER CALC-MCNC: 3 G/DL (ref 1.9–3.5)
GLUCOSE BLD-MCNC: 98 MG/DL (ref 70–100)
GLUCOSE SERPL-MCNC: 83 MG/DL (ref 65–99)
HCG SERPL QL: NEGATIVE
HCT VFR BLD AUTO: 43.2 % (ref 37–47)
HGB BLD-MCNC: 14 G/DL (ref 12–16)
IMM GRANULOCYTES # BLD AUTO: 0.02 K/UL (ref 0–0.11)
IMM GRANULOCYTES NFR BLD AUTO: 0.2 % (ref 0–0.9)
LYMPHOCYTES # BLD AUTO: 2.19 K/UL (ref 1–4.8)
LYMPHOCYTES NFR BLD: 26.1 % (ref 22–41)
MCH RBC QN AUTO: 29.5 PG (ref 27–33)
MCHC RBC AUTO-ENTMCNC: 32.4 G/DL (ref 33.6–35)
MCV RBC AUTO: 91.1 FL (ref 81.4–97.8)
MONOCYTES # BLD AUTO: 0.55 K/UL (ref 0–0.85)
MONOCYTES NFR BLD AUTO: 6.6 % (ref 0–13.4)
NEUTROPHILS # BLD AUTO: 5.19 K/UL (ref 2–7.15)
NEUTROPHILS NFR BLD: 62 % (ref 44–72)
NRBC # BLD AUTO: 0 K/UL
NRBC BLD-RTO: 0 /100 WBC
PLATELET # BLD AUTO: 354 K/UL (ref 164–446)
PMV BLD AUTO: 9.9 FL (ref 9–12.9)
POTASSIUM SERPL-SCNC: 4.4 MMOL/L (ref 3.6–5.5)
PROT SERPL-MCNC: 7.3 G/DL (ref 6–8.2)
RBC # BLD AUTO: 4.74 M/UL (ref 4.2–5.4)
SODIUM SERPL-SCNC: 141 MMOL/L (ref 135–145)
TROPONIN T SERPL-MCNC: <6 NG/L (ref 6–19)
WBC # BLD AUTO: 8.4 K/UL (ref 4.8–10.8)

## 2021-03-03 PROCEDURE — 93005 ELECTROCARDIOGRAM TRACING: CPT

## 2021-03-03 PROCEDURE — 80053 COMPREHEN METABOLIC PANEL: CPT

## 2021-03-03 PROCEDURE — 99284 EMERGENCY DEPT VISIT MOD MDM: CPT | Mod: EDC

## 2021-03-03 PROCEDURE — 93005 ELECTROCARDIOGRAM TRACING: CPT | Performed by: EMERGENCY MEDICINE

## 2021-03-03 PROCEDURE — 85025 COMPLETE CBC W/AUTO DIFF WBC: CPT

## 2021-03-03 PROCEDURE — 36415 COLL VENOUS BLD VENIPUNCTURE: CPT | Mod: EDC

## 2021-03-03 PROCEDURE — 93000 ELECTROCARDIOGRAM COMPLETE: CPT | Performed by: PHYSICIAN ASSISTANT

## 2021-03-03 PROCEDURE — 84484 ASSAY OF TROPONIN QUANT: CPT

## 2021-03-03 PROCEDURE — 71045 X-RAY EXAM CHEST 1 VIEW: CPT

## 2021-03-03 PROCEDURE — 99213 OFFICE O/P EST LOW 20 MIN: CPT | Performed by: PHYSICIAN ASSISTANT

## 2021-03-03 PROCEDURE — 82962 GLUCOSE BLOOD TEST: CPT | Performed by: PHYSICIAN ASSISTANT

## 2021-03-03 PROCEDURE — 84703 CHORIONIC GONADOTROPIN ASSAY: CPT

## 2021-03-03 ASSESSMENT — ENCOUNTER SYMPTOMS
VOMITING: 0
COUGH: 0
FEVER: 0
MYALGIAS: 0
BLURRED VISION: 1
SHORTNESS OF BREATH: 1
SORE THROAT: 0
HEADACHES: 0
NAUSEA: 0

## 2021-03-03 NOTE — ED TRIAGE NOTES
"Chief Complaint   Patient presents with   • Palpitations     x weeks. worse yesterday \"highest HR was 160's\".   • Syncope     this am. denies hitting head.      Arrived aaox4. Educated on triage process. Instructed to notify staff for any worsening symptoms. Denies any recent travel. Denies exposure to known covid positive patients. Denies any respiratory symptoms. Skin pwd.  "

## 2021-03-03 NOTE — PROGRESS NOTES
"Subjective:      Zhanna Edmondson is a 20 y.o. female who presents with Irregular Heart Beat (Elevated HR on and off few weeks, passed out this morning when standing, dizzy at times )        HPI  This is a new problem.   The patient presents to clinic complaining of an elevated heart rate x3 weeks.  The patient states she has been experiencing intermittent fluctuations in her heart rate over the past 3 weeks.  The patient states her heart rate has been ranging from .  The patient reports intermittent skipped beats, stating she often feels her heart skip a beat before her heart rate becomes elevated.  The patient states she is experiencing intermittent dizziness with the elevated heart rate.  The patient also reports dizziness upon standing.  The patient notes intermittent shortness of breath with the elevated heart rate.  The patient states that she had a syncopal episode earlier this morning.  The patient states she had just gotten up from bed when she subsequently passed out and woke up on the floor.  The patient believes no more than a few seconds past before she was awake and alert.  The patient states her syncopal episode was unwitnessed.  The patient states she is experiencing \"fuzzy\" vision with the elevated heart rate.  The patient states her vision turned \"white\" with the elevated heart rate.  The patient reports no associated chest pain.  No nausea/vomiting.  No headache. No fever  The patient notes intermittent tingling of her extremities with certain positions.  She denies numbness and weakness.  The patient states she has not had any additional syncopal episodes.  She reports no history of same.  The patient has not taken any OTC medications for her current symptoms.     PMH:  has no past medical history on file.  MEDS:   Current Outpatient Medications:   •  GLUCOSAMINE-CALCIUM-VIT D PO, Take  by mouth., Disp: , Rfl:   •  Cholecalciferol (D3 VITAMIN PO), Take 3-4 Caps by mouth every 72 hours., Disp: " ", Rfl:   •  Multiple Vitamin (MULTI-DAY PO), Take 1 Package by mouth every 72 hours., Disp: , Rfl:   •  escitalopram (LEXAPRO) 10 MG Tab, Take 10 mg by mouth every day., Disp: , Rfl:   ALLERGIES: No Known Allergies  SURGHX: No past surgical history on file.  SOCHX:  reports that she has never smoked. She has never used smokeless tobacco. She reports current alcohol use. She reports that she does not use drugs.  FH: Family history was reviewed, no pertinent findings to report      Review of Systems   Constitutional: Negative for fever.   HENT: Negative for congestion, ear pain and sore throat.    Eyes: Positive for blurred vision (The patient states she is experiencing \"fuzzy\" vision with the elevated heart rate.  The patient states her vision turns \"white\" with the elevated heart rate.).   Respiratory: Positive for shortness of breath (The patient reports intermittent shortness of breath with the elevated heart rate.  ). Negative for cough.    Cardiovascular: Negative for chest pain and leg swelling.   Gastrointestinal: Negative for nausea and vomiting.   Musculoskeletal: Negative for myalgias.   Skin: Negative for rash.   Neurological: Negative for headaches.          Objective:     /82 (BP Location: Left arm, Patient Position: Sitting, BP Cuff Size: Adult)   Pulse 98   Temp 37.1 °C (98.7 °F) (Temporal)   Resp 16   Ht 1.651 m (5' 5\")   Wt 87.5 kg (193 lb)   LMP 02/01/2021   SpO2 96%   BMI 32.12 kg/m²      Physical Exam  Constitutional:       General: She is not in acute distress.     Appearance: Normal appearance. She is not ill-appearing.   HENT:      Head: Normocephalic and atraumatic.      Right Ear: External ear normal.      Left Ear: External ear normal.      Nose: Nose normal.      Mouth/Throat:      Mouth: Mucous membranes are moist.      Pharynx: Oropharynx is clear.   Eyes:      Extraocular Movements: Extraocular movements intact.      Conjunctiva/sclera: Conjunctivae normal.      Pupils: " Pupils are equal, round, and reactive to light.   Cardiovascular:      Rate and Rhythm: Normal rate and regular rhythm.      Heart sounds: Normal heart sounds.   Pulmonary:      Effort: Pulmonary effort is normal. No respiratory distress.      Breath sounds: Normal breath sounds. No wheezing.   Musculoskeletal:         General: Normal range of motion.      Cervical back: Normal range of motion and neck supple.   Skin:     General: Skin is warm and dry.   Neurological:      General: No focal deficit present.      Mental Status: She is alert and oriented to person, place, and time.            Progress:  EKG Interpretation   Interpreted by me   Rhythm: normal sinus   Rate: normal   Axis: normal   Ectopy: none   ST Segments: no acute change   T Waves: no acute change   Q Waves: none   Clinical Impression: no acute changes and normal EKG  There is no prior EKG for comparison.    POCT Glucose: 98       Assessment/Plan:        1. Syncope, unspecified syncope type  - EKG  - POCT Glucose    The patient's presenting symptoms and physical exam findings are consistent with syncope.  The patient had a single syncopal episode earlier today that was unwitnessed.  The patient states she has been experiencing a fluctuation in her heart rate over the past 3 weeks with associated dizziness.  The patient's physical exam today in clinic was normal.  The patient's heart was regular rate and rhythm without murmurs, gallops, or rubs.  The patient's lungs were clear to auscultation without wheezing, and her pulse ox was within normal limits.  No focal neurological deficits were appreciated.  An EKG was obtained to further evaluate the patient's current symptoms.  The patient's EKG showed normal sinus rhythm with a heart rate of 71.  No acute ST segment changes were noted.  There is no prior EKG for comparison.  The patient's POCT random glucose today in clinic was within normal limits at 98.  The cause of the patient's current symptoms is  unknown at this time.  The patient may be experiencing vasovagal-like symptoms, which contributed to her syncope.  Based on the patient's presenting symptoms and physical exam findings, I believe the patient would benefit from further evaluation in the ED at this time.  The patient agrees to this plan.  The patient is stable for transfer via private vehicle at this time, but was instructed not to drive herself given the current symptoms.  The patient verbalized understanding.  The patient states a friend will take her to the ED.  Advised the patient of the social risk of not seeking further care for her current symptoms, and she verbalized understanding.    Plan:   Transfer patient to the [facility] ED for further evaluation and management.     I personally reviewed prior external notes and test results pertinent to today's visit.  I have independently reviewed and interpreted all diagnostics ordered during this urgent care visit.     Time spent evaluating this patient was at least 30 minutes and includes preparing for visit, obtaining history, exam and evaluation, ordering labs/tests/procedures/medications, independent interpretation, and counseling/education.    Please note that this dictation was created using voice recognition software. I have made every reasonable attempt to correct obvious errors, but I expect that there may be errors of grammar and possibly content that I did not discover before finalizing the note.     This note was electronically signed by Radha Hargrove PA-C

## 2021-03-04 NOTE — ED NOTES
Discharge teaching for palpitations and syncope provided to patient. Reviewed home care, importance of hydration and when to return to ED with worsening symptoms. Instructed on importance of follow up care with [facility]  [address] [address] [phone]  Call   to establish primary care physician    [facility], Emergency Dept  [address] [facility] [address] [phone]    If symptoms worsen     All questions answered, patient verbalizes understanding to all teaching. Copy of discharge paperwork provided. Signed copy in chart. Armband removed. Pt alert, pink, interactive and in NAD. Ambulatory out of department in stable condition.

## 2021-03-04 NOTE — DISCHARGE INSTRUCTIONS
You were seen in the Emergency Department for syncope and palpitations.    EKG, labs, chest xray were completed without significant acute abnormalities.    Please use 1,000mg of tylenol or 600mg of ibuprofen every 6 hours as needed for pain.  Drink plenty of fluids and reduce stress.    Please follow up with your primary care physician for possible Holter monitor.    Return to the Emergency Department with recurrent fainting, chest pain, trouble breathing, or other concerns.

## 2021-03-04 NOTE — ED PROVIDER NOTES
"ED Provider Note    Scribed for Emi Bates M.D. by Karen Soria. 3/3/2021  4:52 PM    Means of arrival: Walk in  History obtained from: Patient   History limited by: None       CHIEF COMPLAINT  Chief Complaint   Patient presents with   • Palpitations     x weeks. worse yesterday \"highest HR was 160's\".   • Syncope     this am. denies hitting head.        HPI  Zhanna Edmondson is a 20 y.o. female with history of depression and anxiety who presents to the Emergency Department for for evaluation of palpitations and lightheaded that began a couple weeks ago. Patient reports it started with dizziness, palpitations, and heart racing. She states she has been gradually feeling worse since onset of her symptoms. Patient states yesterday she was at work and felt lightheaded, clammy, was \"seeing white.\"  She has measured her heart rate with a home pulse oximeter and states that it has been in the 120s to 130s however as high as 160.. Patient states this morning when she got out of bed she passed out for a few seconds and woke up on the floor, prompting her visit to urgent care. She has noticed some mild shortness of breath that accompanies her dizziness. She denies any chest pain, nausea, vomiting, recent sickness.  No leg swelling or history of oral contraception.  Patient states she has been eating and drinking normally. Patient has a history of anxiety or depression, states this does not feel like anxiety. Patient denies a history of similar symptoms.     REVIEW OF SYSTEMS  Pertinent positive include dizziness, palpitations, heart racing, lightheaded, clammy, syncope, shortness of breath. Pertinent negative include chest pain, nausea, vomiting, recent sickness. All other systems reviewed and are negative.      PAST MEDICAL HISTORY   Depression, Anxiety.     SOCIAL HISTORY  Social History     Tobacco Use   • Smoking status: Never Smoker   • Smokeless tobacco: Never Used   Substance and Sexual Activity   • Alcohol use: " "Yes   • Drug use: Never   • Sexual activity: Not on file       SURGICAL HISTORY  patient denies any surgical history    CURRENT MEDICATIONS  Home Medications     Reviewed by La Galvez R.N. (Registered Nurse) on 03/03/21 at 1543  Med List Status: Partial   Medication Last Dose Status   Cholecalciferol (D3 VITAMIN PO)  Active   escitalopram (LEXAPRO) 10 MG Tab  Active   GLUCOSAMINE-CALCIUM-VIT D PO  Active   Multiple Vitamin (MULTI-DAY PO)  Active                ALLERGIES  No Known Allergies    PHYSICAL EXAM   VITAL SIGNS: /93   Pulse 84   Temp 36.1 °C (97 °F) (Temporal)   Resp 14   Ht 1.651 m (5' 5\")   Wt 87.7 kg (193 lb 5.5 oz)   LMP 02/01/2021   SpO2 98%   BMI 32.17 kg/m²    Constitutional: Nontoxic appearing young female.  Alert in no apparent distress.  HENT: Normocephalic, Atraumatic. Bilateral external ears normal. Nose normal.  Moist mucous membranes.  Oropharynx clear.  Eyes: Pupils are equal and reactive. Conjunctiva normal.   Neck: Supple, full range of motion  Heart: Regular rate and rhythm.  No murmurs.    Lungs: No respiratory distress, normal work of breathing. Lungs clear to auscultation bilaterally.  Abdomen Soft, no distention.  No tenderness to palpation.  Musculoskeletal: Atraumatic. No obvious deformities noted.  No lower extremity edema.  Skin: Warm, Dry.  No erythema, No rash.   Neurologic: Alert and oriented x3. Moving all extremities spontaneously without focal deficits.  Psychiatric: Affect normal, Mood normal, Appears appropriate and not intoxicated.      DIAGNOSTIC STUDIES    EKG  EKG personally reviewed by myself as below.  Results for orders placed or performed during the hospital encounter of 03/03/21   EKG (NOW)   Result Value Ref Range    Report       [facility] Emergency Dept.    Test Date:  2021-03-03  Pt Name:    ALBA BAKER               Department: ER  MRN:        [id number]                      Room:  Gender:     Female                       " Technician: [id number]  :        2000                   Requested By:ER TRIAGE PROTOCOL  Order #:    [id number]                    Reading MD: Emi Bates MD    Measurements  Intervals                                Axis  Rate:       68                           P:          17  SC:         140                          QRS:        70  QRSD:       78                           T:          44  QT:         388  QTc:        413    Interpretive Statements  SINUS RHYTHM  Normal axis and intervals  No ectopy or hypertrophy  No ST or T wave change  No previous ECG available for comparison  Electronically Signed On 3-3-2021 16:41:14 PST by Emi Bates MD         LABS  Personally reviewed by me  Labs Reviewed   CBC WITH DIFFERENTIAL - Abnormal; Notable for the following components:       Result Value    MCHC 32.4 (*)     All other components within normal limits   COMP METABOLIC PANEL   TROPONIN   HCG QUAL SERUM   ESTIMATED GFR       RADIOLOGY  Personally reviewed by me  DX-CHEST-PORTABLE (1 VIEW)   Final Result      No acute cardiopulmonary disease.          ED COURSE  Vitals:    21 1709 21 1710 21 1711 21 1800   BP: 114/69 120/80 117/83 113/72   Pulse: 64 90 92 85   Resp:    20   Temp:    36.6 °C (97.9 °F)   TempSrc:    Temporal   SpO2:    97%   Weight:       Height:             Medications administered:  Medications - No data to display      Old records personally reviewed:  Patient seen at urgent care, EKG and glucose taken which were normal.     4:52 PM Patient seen and examined at bedside. The patient presents with palpitation, syncope, shortness of breath. Ordered for DX Chest, HCG Qual, CBC w/ differential, CMP, Troponin, EKG to evaluate.       MEDICAL DECISION MAKING  Young female with history of depression and anxiety who presents with few week history of lightheadedness and palpitations followed by a syncopal episode today.  She is nontoxic-appearing with normal vital signs on  arrival.  Her EKG does not show signs of ischemia or arrhythmia.  No concerns for HOCM, Brugada syndrome, WPW, or prolonged QT syndrome.  Labs are reassuring without anemia or electrolyte abnormality.  The patient is not pregnant.  She was monitored here in the department without signs of tachycardia or arrhythmia.  My suspicion for pulmonary embolism is very low.  The patient would like to hold off on IV placement or imaging at this time as she does not currently have insurance which I feel is reasonable.    5:52 PM on reassessment, patient is resting comfortably with normal vital signs. No new complaints at this time.  Discussed results with patient and/or family as well as importance of primary care follow up Patient understands plan of care and strict return precautions for changing or worsening symptoms.  . I let her know the  can call her with different options of places she can go now or once she has insurance. I recommended reducing stress, eating well, drinking lots of fluids, and getting good sleep. I    The patient will return for new or worsening symptoms and is stable at the time of discharge.    The patient is referred to a primary physician for blood pressure management, diabetic screening, and for all other preventative health concerns.    DISPOSITION:  Patient will be discharged home in stable condition.    FOLLOW UP:  [address] [address] [phone]  Call   to establish primary care physician    [facility], Emergency Dept  [address] [facility] [address] [phone]    If symptoms worsen      IMPRESSION  (R55) Syncope, unspecified syncope type  (R00.2) Palpitations    Results, diagnoses, and treatment options were discussed with the patient and/or family. Patient verbalized understanding of plan of care.     I, Karen Soria (Scribe), am scribing for, and in the presence of, Emi Bates,  M.D..    Electronically signed by: Karen Soria (Massimo), 3/3/2021    Emi MOREIRA M.D. personally performed the services described in this documentation, as scribed by Karen Soria in my presence, and it is both accurate and complete.    C    The note accurately reflects work and decisions made by me.  Emi Bates M.D.  3/4/2021  12:34 AM

## 2021-03-04 NOTE — ED NOTES
"Pt ambulatory to Peds 43. Agree with triage RN note. Instructed to change into gown. Awake and alert. Reports episodes of chest palpitations, dizziness, \"feeling clammy\" and SOB x a few weeks. Worsening palpitations yesterday associated with \"white spots in my vision\". Today when standing pt had an unwitnessed syncopal episode. Pt awoke on her floor, denies head injury stating she landed on a pile of laundry. Reports hx of anxiety, but states \"it doesn't feel like this\". Call light within reach. Denies additional needs.     "

## 2021-04-01 ENCOUNTER — TELEPHONE (OUTPATIENT)
Dept: SCHEDULING | Facility: IMAGING CENTER | Age: 21
End: 2021-04-01

## 2021-04-06 ENCOUNTER — OFFICE VISIT (OUTPATIENT)
Dept: MEDICAL GROUP | Facility: MEDICAL CENTER | Age: 21
End: 2021-04-06
Payer: COMMERCIAL

## 2021-04-06 VITALS
BODY MASS INDEX: 31.5 KG/M2 | DIASTOLIC BLOOD PRESSURE: 76 MMHG | TEMPERATURE: 98.8 F | SYSTOLIC BLOOD PRESSURE: 112 MMHG | HEIGHT: 66 IN | WEIGHT: 196 LBS | HEART RATE: 85 BPM | OXYGEN SATURATION: 98 %

## 2021-04-06 DIAGNOSIS — F41.9 ANXIETY AND DEPRESSION: ICD-10-CM

## 2021-04-06 DIAGNOSIS — R79.89 LOW VITAMIN D LEVEL: ICD-10-CM

## 2021-04-06 DIAGNOSIS — Z00.00 HEALTHCARE MAINTENANCE: ICD-10-CM

## 2021-04-06 DIAGNOSIS — M25.59 PAIN IN OTHER JOINT: ICD-10-CM

## 2021-04-06 DIAGNOSIS — F32.A ANXIETY AND DEPRESSION: ICD-10-CM

## 2021-04-06 DIAGNOSIS — Z78.9 VEGETARIAN DIET: ICD-10-CM

## 2021-04-06 PROBLEM — M25.50 JOINT PAIN: Status: ACTIVE | Noted: 2021-04-06

## 2021-04-06 PROCEDURE — 99214 OFFICE O/P EST MOD 30 MIN: CPT | Performed by: PHYSICIAN ASSISTANT

## 2021-04-06 RX ORDER — ESCITALOPRAM OXALATE 20 MG/1
TABLET ORAL
Qty: 30 TABLET | Refills: 3 | Status: ON HOLD | OUTPATIENT
Start: 2021-04-06 | End: 2023-01-14

## 2021-04-06 ASSESSMENT — PATIENT HEALTH QUESTIONNAIRE - PHQ9
CLINICAL INTERPRETATION OF PHQ2 SCORE: 5
5. POOR APPETITE OR OVEREATING: 3 - NEARLY EVERY DAY
SUM OF ALL RESPONSES TO PHQ QUESTIONS 1-9: 22

## 2021-04-06 ASSESSMENT — ANXIETY QUESTIONNAIRES
3. WORRYING TOO MUCH ABOUT DIFFERENT THINGS: NEARLY EVERY DAY
4. TROUBLE RELAXING: MORE THAN HALF THE DAYS
7. FEELING AFRAID AS IF SOMETHING AWFUL MIGHT HAPPEN: SEVERAL DAYS
2. NOT BEING ABLE TO STOP OR CONTROL WORRYING: NEARLY EVERY DAY
6. BECOMING EASILY ANNOYED OR IRRITABLE: NEARLY EVERY DAY
1. FEELING NERVOUS, ANXIOUS, OR ON EDGE: NEARLY EVERY DAY
5. BEING SO RESTLESS THAT IT IS HARD TO SIT STILL: MORE THAN HALF THE DAYS
GAD7 TOTAL SCORE: 17

## 2021-04-06 ASSESSMENT — FIBROSIS 4 INDEX: FIB4 SCORE: 0.25

## 2021-04-06 NOTE — ASSESSMENT & PLAN NOTE
Patient is a pleasant 20-year-old female who comes in to [facility] care.  Has a history of anxiety and depression.  States she requires for struggling with the symptoms in middle school.  Was put on medication for the first time in approximately October 2020.  Is currently taking Lexapro 10 mg daily.  Feels that this medication has helped approximately 50% when she takes it regularly.  However she takes in the evening around 9 PM and sometimes forgets secondary to overwhelming sense of fatigue and long hours at work.  Does also report generalized body aches fatigue and joint pain for a few years.  Denies any known personal or family history of rheumatoid arthritis or lupus.  Has had recent labs done.  Was found to have a low vitamin D and put on supplementation.  Other lab analysis, to her knowledge, was unremarkable.  Patient does currently see a therapist on [facility] twice weekly and feels this is helpful.  Has done self-harm in the past but states she does not have homicidal ideation or suicidal ideation currently.  Also recognizes that self-harm is not good for her

## 2021-04-06 NOTE — ASSESSMENT & PLAN NOTE
Reports that it has been for many years.  Is generalized in all over.  Does seem to be worse when anxiety and depression is worse.  Denies redness or swelling of any particular joint.  Denies known history of Lyme disease.  Denies known personal or family history of autoimmune disorders

## 2021-04-06 NOTE — PROGRESS NOTES
Subjective:   Zhanna Edmondson is a 20 y.o. female here today for anxiety and depression    Anxiety and depression  Chronic: patient is a pleasant 20-year-old female who comes in to [facility] care.  Has a history of anxiety and depression.  States she requires for struggling with the symptoms in middle school.  Was put on medication for the first time in approximately October 2020.  Is currently taking Lexapro 10 mg daily.  Feels that this medication has helped approximately 50% when she takes it regularly.  However she takes in the evening around 9 PM and sometimes forgets secondary to overwhelming sense of fatigue and long hours at work.  Does also report generalized body aches fatigue and joint pain for a few years.  Denies any known personal or family history of rheumatoid arthritis or lupus.  Has had recent labs done.  Was found to have a low vitamin D and put on supplementation.  Other lab analysis, to her knowledge, was unremarkable.  Patient does currently see a therapist on [facility] twice weekly and feels this is helpful.  Has done self-harm in the past but states she does not have homicidal ideation or suicidal ideation currently.  Also recognizes that self-harm is not good for her    Depression Screening    Little interest or pleasure in doing things?  2 - more than half the days   Feeling down, depressed , or hopeless? 3 - nearly every day   Trouble falling or staying asleep, or sleeping too much?  2 - more than half the days   Feeling tired or having little energy?  2 - more than half the days   Poor appetite or overeating?  3 - nearly every day   Feeling bad about yourself - or that you are a failure or have let yourself or your family down? 3 - nearly every day   Trouble concentrating on things, such as reading the newspaper or watching television? 3 - nearly every day   Moving or speaking so slowly that other people could have noticed.  Or the opposite - being so fidgety or restless that you have been  moving around a lot more than usual?  1 - several days   Thoughts that you would be better off dead, or of hurting yourself?  3 - nearly every day   Patient Health Questionnaire Score: 22       If depressive symptoms identified deferred to follow up visit unless specifically addressed in assesment and plan.    MARGARET 7 4/6/2021   MARGARET-7 Total Score 17       Interpretation of MARGARET 7 Total Score   Score Severity:  0-4 No Anxiety   5-9 Mild Anxiety  10-14 Moderate Anxiety  15-21 Severe Anxiety      Interpretation of PHQ-9 Total Score   Score Severity   1-4 No Depression   5-9 Mild Depression   10-14 Moderate Depression   15-19 Moderately Severe Depression   20-27 Severe Depression      Joint pain  Chronic:  Reports that it has been for many years.  Is generalized in all over.  Does seem to be worse when anxiety and depression is worse.  Denies redness or swelling of any particular joint.  Denies known history of Lyme disease.  Denies known personal or family history of autoimmune disorders         Current medicines (including changes today)  Current Outpatient Medications   Medication Sig Dispense Refill   • escitalopram (LEXAPRO) 20 MG tablet Take one pill every evening 30 tablet 3   • GLUCOSAMINE-CALCIUM-VIT D PO Take  by mouth.     • Multiple Vitamin (MULTI-DAY PO) Take 1 Package by mouth every 72 hours.     • Cholecalciferol (D3 VITAMIN PO) Take 3-4 Caps by mouth every 72 hours.       No current facility-administered medications for this visit.     She  has a past medical history of Anxiety and Depression.  Patient has no known allergies.     Social History and Family History were reviewed and updated.    ROS   No headaches, chest pain, no shortness of breath, abdominal pain, nausea, or vomiting.  All other systems were reviewed and are negative or noted as positive in the HPI.       Objective:     /76 (BP Location: Right arm, Patient Position: Sitting)   Pulse 85   Temp 37.1 °C (98.8 °F) (Temporal)   Ht 1.676  "m (5' 6\")   Wt 88.9 kg (196 lb)   SpO2 98%  Body mass index is 31.64 kg/m².     Physical Exam:  Constitutional: ANO x3, no acute distress, well-nourished, well-hydrated   Skin: Warm, dry, good turgor, no rashes in visible areas.  HEENT: Is normocephalic atraumatic, good PERRLA, extraocular movements intact, TMs and oropharynx are clear with good dentition   Neck: Soft and supple, trachea midline, no masses.  No thyroid megaly or cervical lymphadenopathy noted  Cardiovascular: Regular rate and rhythm.  Normal S1 and 2, no murmurs, rubs or gallops.  No edema noted  Lungs: Clear to auscultation bilaterally.  No wheezes, rales or rhonchi.  Good inspiratory and expiratory breath sounds  Abdomen: Soft, non-tender, no masses.  No hepatosplenomegaly.  Negative Gutierrez's  Psych: Alert and oriented x3, normal affect and mood.  Neuro: Cranial nerves II through XII were assessed and intact.  Normal gait, normal cerebellar function noted  Musculoskeletal: Full range of motion, good strength against resistance    Clinical Course/Lab Analysis:    Reviewed previously labs ordered in the emergency room on March 2021 and discussed all results with patient      Assessment and Plan:   The following treatment plan was discussed.  Signs and symptoms for which to return were discussed with patient at length.  Patient verbalized understanding.    1. Anxiety and depression  - VITAMIN B12; Future  - TSH WITH REFLEX TO FT4; Future  - escitalopram (LEXAPRO) 20 MG tablet; Take one pill every evening  Dispense: 30 tablet; Refill: 3  Unstable: Patient currently does counseling twice a week.  Does not wish for a new referral.  Please continue this.  Will increase Lexapro from 10 mg to 20 mg and hello for follow-up in 4 to 8 weeks.  Did discuss side effect profile at length that she may benefit from an SNRI.  Will discuss further.  Would like her to start exercising regularly.  Continue to hike with dog on weekends.  Discussed avoidance of gluten, " dairy and decrease sugar intake as these are inflammatory in nature    2. Pain in other joint  - RHEUMATOID ARTHRITIS FACTOR; Future  - Sed Rate; Future  - ANTI-NUCLEAR ANTIBODY SERUM; Future  - TSH WITH REFLEX TO FT4; Future  Chronic: Stable can take anti-inflammatories.  We will do a panel to check if any signs of autoimmune process going on    3. Low vitamin D level  - VITAMIN D,25 HYDROXY; Future  Chronic: Stable.  Was told she had a level of 5 a few years ago.  Will check to see what her level is.  Continue vitamin D supplementation  4. Vegetarian diet  - VITAMIN B12; Future    5. Healthcare maintenance  - Chlamydia/GC PCR Urine Or Swab; Future  We will need to set up for Pap in the next few months.      Followup: Return in about 8 weeks (around 6/1/2021).     Please note that this dictation was created using voice recognition software. I have made every reasonable attempt to correct obvious errors, but I expect that there are errors of grammar and possibly content that I did not discover before finalizing the note.

## 2021-06-08 ENCOUNTER — APPOINTMENT (OUTPATIENT)
Dept: MEDICAL GROUP | Facility: MEDICAL CENTER | Age: 21
End: 2021-06-08

## 2021-06-08 ENCOUNTER — SUPERVISING PHYSICIAN REVIEW (OUTPATIENT)
Dept: MEDICAL GROUP | Facility: MEDICAL CENTER | Age: 21
End: 2021-06-08

## 2021-06-08 NOTE — PROGRESS NOTES
I have reviewed and agree with history, assessment and plan for office encounter on 4/6/21 with Advanced Practice Provider: LEILA Cole.  Face to face encounter/direct observation: No  Suggested changes to plan or follow-up: Agree with medication titration given the levels of depression.  If SSRI therapy does not fully control symptoms could consider adding Wellbutrin or switching to SNRI such as duloxetine which also has some chronic pain component.  Unclear how helpful MADALYN will be for screening chronic joint pain.  Jen Garduno M.D.

## 2023-01-13 ENCOUNTER — HOSPITAL ENCOUNTER (INPATIENT)
Facility: MEDICAL CENTER | Age: 23
LOS: 2 days | DRG: 419 | End: 2023-01-15
Attending: EMERGENCY MEDICINE | Admitting: HOSPITALIST
Payer: COMMERCIAL

## 2023-01-13 ENCOUNTER — HOSPITAL ENCOUNTER (OUTPATIENT)
Facility: MEDICAL CENTER | Age: 23
End: 2023-01-13
Attending: NURSE PRACTITIONER
Payer: COMMERCIAL

## 2023-01-13 ENCOUNTER — OFFICE VISIT (OUTPATIENT)
Dept: URGENT CARE | Facility: PHYSICIAN GROUP | Age: 23
End: 2023-01-13
Payer: COMMERCIAL

## 2023-01-13 ENCOUNTER — APPOINTMENT (OUTPATIENT)
Dept: RADIOLOGY | Facility: MEDICAL CENTER | Age: 23
DRG: 419 | End: 2023-01-13
Attending: EMERGENCY MEDICINE
Payer: COMMERCIAL

## 2023-01-13 VITALS
BODY MASS INDEX: 34.55 KG/M2 | DIASTOLIC BLOOD PRESSURE: 80 MMHG | HEIGHT: 66 IN | SYSTOLIC BLOOD PRESSURE: 110 MMHG | RESPIRATION RATE: 18 BRPM | TEMPERATURE: 98.4 F | OXYGEN SATURATION: 97 % | HEART RATE: 105 BPM | WEIGHT: 215 LBS

## 2023-01-13 DIAGNOSIS — R10.9 ABDOMINAL PAIN, UNSPECIFIED ABDOMINAL LOCATION: ICD-10-CM

## 2023-01-13 DIAGNOSIS — K81.0 ACUTE CHOLECYSTITIS: ICD-10-CM

## 2023-01-13 PROBLEM — K81.9 CHOLECYSTITIS WITHOUT CALCULUS: Status: ACTIVE | Noted: 2023-01-13

## 2023-01-13 LAB
ALBUMIN SERPL BCP-MCNC: 4.6 G/DL (ref 3.2–4.9)
ALBUMIN/GLOB SERPL: 1.6 G/DL
ALP SERPL-CCNC: 98 U/L (ref 30–99)
ALT SERPL-CCNC: 15 U/L (ref 2–50)
ANION GAP SERPL CALC-SCNC: 10 MMOL/L (ref 7–16)
APPEARANCE UR: NORMAL
AST SERPL-CCNC: 14 U/L (ref 12–45)
BASOPHILS # BLD AUTO: 0.3 % (ref 0–1.8)
BASOPHILS # BLD: 0.04 K/UL (ref 0–0.12)
BILIRUB SERPL-MCNC: 0.2 MG/DL (ref 0.1–1.5)
BILIRUB UR STRIP-MCNC: NEGATIVE MG/DL
BUN SERPL-MCNC: 14 MG/DL (ref 8–22)
CALCIUM ALBUM COR SERPL-MCNC: 9.1 MG/DL (ref 8.5–10.5)
CALCIUM SERPL-MCNC: 9.6 MG/DL (ref 8.4–10.2)
CHLORIDE SERPL-SCNC: 109 MMOL/L (ref 96–112)
CO2 SERPL-SCNC: 23 MMOL/L (ref 20–33)
COLOR UR AUTO: YELLOW
CREAT SERPL-MCNC: 0.67 MG/DL (ref 0.5–1.4)
EOSINOPHIL # BLD AUTO: 0.17 K/UL (ref 0–0.51)
EOSINOPHIL NFR BLD: 1.1 % (ref 0–6.9)
ERYTHROCYTE [DISTWIDTH] IN BLOOD BY AUTOMATED COUNT: 42.7 FL (ref 35.9–50)
GFR SERPLBLD CREATININE-BSD FMLA CKD-EPI: 126 ML/MIN/1.73 M 2
GLOBULIN SER CALC-MCNC: 2.9 G/DL (ref 1.9–3.5)
GLUCOSE SERPL-MCNC: 86 MG/DL (ref 65–99)
GLUCOSE UR STRIP.AUTO-MCNC: NEGATIVE MG/DL
HCG SERPL QL: NEGATIVE
HCT VFR BLD AUTO: 40.6 % (ref 37–47)
HGB BLD-MCNC: 13 G/DL (ref 12–16)
IMM GRANULOCYTES # BLD AUTO: 0.05 K/UL (ref 0–0.11)
IMM GRANULOCYTES NFR BLD AUTO: 0.3 % (ref 0–0.9)
INT CON NEG: NORMAL
INT CON POS: NORMAL
KETONES UR STRIP.AUTO-MCNC: NORMAL MG/DL
LEUKOCYTE ESTERASE UR QL STRIP.AUTO: NORMAL
LIPASE SERPL-CCNC: 32 U/L (ref 7–58)
LYMPHOCYTES # BLD AUTO: 2.79 K/UL (ref 1–4.8)
LYMPHOCYTES NFR BLD: 18.6 % (ref 22–41)
MCH RBC QN AUTO: 28.8 PG (ref 27–33)
MCHC RBC AUTO-ENTMCNC: 32 G/DL (ref 33.6–35)
MCV RBC AUTO: 89.8 FL (ref 81.4–97.8)
MONOCYTES # BLD AUTO: 0.86 K/UL (ref 0–0.85)
MONOCYTES NFR BLD AUTO: 5.7 % (ref 0–13.4)
NEUTROPHILS # BLD AUTO: 11.09 K/UL (ref 2–7.15)
NEUTROPHILS NFR BLD: 74 % (ref 44–72)
NITRITE UR QL STRIP.AUTO: NEGATIVE
NRBC # BLD AUTO: 0 K/UL
NRBC BLD-RTO: 0 /100 WBC
PH UR STRIP.AUTO: 6 [PH] (ref 5–8)
PLATELET # BLD AUTO: 441 K/UL (ref 164–446)
PMV BLD AUTO: 9.8 FL (ref 9–12.9)
POC URINE PREGNANCY TEST: NEGATIVE
POTASSIUM SERPL-SCNC: 3.9 MMOL/L (ref 3.6–5.5)
PROT SERPL-MCNC: 7.5 G/DL (ref 6–8.2)
PROT UR QL STRIP: NEGATIVE MG/DL
RBC # BLD AUTO: 4.52 M/UL (ref 4.2–5.4)
RBC UR QL AUTO: NORMAL
SODIUM SERPL-SCNC: 142 MMOL/L (ref 135–145)
SP GR UR STRIP.AUTO: >=1.03
UROBILINOGEN UR STRIP-MCNC: 0.2 MG/DL
WBC # BLD AUTO: 15 K/UL (ref 4.8–10.8)

## 2023-01-13 PROCEDURE — 700111 HCHG RX REV CODE 636 W/ 250 OVERRIDE (IP): Performed by: EMERGENCY MEDICINE

## 2023-01-13 PROCEDURE — 700102 HCHG RX REV CODE 250 W/ 637 OVERRIDE(OP): Performed by: HOSPITALIST

## 2023-01-13 PROCEDURE — 96375 TX/PRO/DX INJ NEW DRUG ADDON: CPT

## 2023-01-13 PROCEDURE — 85025 COMPLETE CBC W/AUTO DIFF WBC: CPT

## 2023-01-13 PROCEDURE — 80053 COMPREHEN METABOLIC PANEL: CPT

## 2023-01-13 PROCEDURE — 99285 EMERGENCY DEPT VISIT HI MDM: CPT

## 2023-01-13 PROCEDURE — 700105 HCHG RX REV CODE 258: Performed by: EMERGENCY MEDICINE

## 2023-01-13 PROCEDURE — 83690 ASSAY OF LIPASE: CPT

## 2023-01-13 PROCEDURE — 99215 OFFICE O/P EST HI 40 MIN: CPT | Performed by: NURSE PRACTITIONER

## 2023-01-13 PROCEDURE — 81002 URINALYSIS NONAUTO W/O SCOPE: CPT | Performed by: NURSE PRACTITIONER

## 2023-01-13 PROCEDURE — 700101 HCHG RX REV CODE 250: Performed by: EMERGENCY MEDICINE

## 2023-01-13 PROCEDURE — 94760 N-INVAS EAR/PLS OXIMETRY 1: CPT

## 2023-01-13 PROCEDURE — 770001 HCHG ROOM/CARE - MED/SURG/GYN PRIV*

## 2023-01-13 PROCEDURE — 96365 THER/PROPH/DIAG IV INF INIT: CPT

## 2023-01-13 PROCEDURE — A9270 NON-COVERED ITEM OR SERVICE: HCPCS | Performed by: HOSPITALIST

## 2023-01-13 PROCEDURE — 36415 COLL VENOUS BLD VENIPUNCTURE: CPT

## 2023-01-13 PROCEDURE — 84703 CHORIONIC GONADOTROPIN ASSAY: CPT

## 2023-01-13 PROCEDURE — 99223 1ST HOSP IP/OBS HIGH 75: CPT | Performed by: HOSPITALIST

## 2023-01-13 PROCEDURE — 700105 HCHG RX REV CODE 258: Performed by: HOSPITALIST

## 2023-01-13 PROCEDURE — 87086 URINE CULTURE/COLONY COUNT: CPT

## 2023-01-13 PROCEDURE — 81025 URINE PREGNANCY TEST: CPT | Performed by: NURSE PRACTITIONER

## 2023-01-13 PROCEDURE — 76705 ECHO EXAM OF ABDOMEN: CPT

## 2023-01-13 RX ORDER — SODIUM CHLORIDE 9 MG/ML
INJECTION, SOLUTION INTRAVENOUS CONTINUOUS
Status: DISCONTINUED | OUTPATIENT
Start: 2023-01-13 | End: 2023-01-15 | Stop reason: HOSPADM

## 2023-01-13 RX ORDER — OXYCODONE HYDROCHLORIDE 5 MG/1
5 TABLET ORAL
Status: DISCONTINUED | OUTPATIENT
Start: 2023-01-13 | End: 2023-01-15 | Stop reason: HOSPADM

## 2023-01-13 RX ORDER — PROMETHAZINE HYDROCHLORIDE 25 MG/1
12.5-25 SUPPOSITORY RECTAL EVERY 4 HOURS PRN
Status: DISCONTINUED | OUTPATIENT
Start: 2023-01-13 | End: 2023-01-15 | Stop reason: HOSPADM

## 2023-01-13 RX ORDER — METRONIDAZOLE 500 MG/100ML
500 INJECTION, SOLUTION INTRAVENOUS EVERY 8 HOURS
Status: DISCONTINUED | OUTPATIENT
Start: 2023-01-14 | End: 2023-01-15 | Stop reason: HOSPADM

## 2023-01-13 RX ORDER — KETOROLAC TROMETHAMINE 30 MG/ML
15 INJECTION, SOLUTION INTRAMUSCULAR; INTRAVENOUS ONCE
Status: COMPLETED | OUTPATIENT
Start: 2023-01-13 | End: 2023-01-13

## 2023-01-13 RX ORDER — AMOXICILLIN 250 MG
2 CAPSULE ORAL 2 TIMES DAILY
Status: DISCONTINUED | OUTPATIENT
Start: 2023-01-13 | End: 2023-01-15 | Stop reason: HOSPADM

## 2023-01-13 RX ORDER — BISACODYL 10 MG
10 SUPPOSITORY, RECTAL RECTAL
Status: DISCONTINUED | OUTPATIENT
Start: 2023-01-13 | End: 2023-01-15 | Stop reason: HOSPADM

## 2023-01-13 RX ORDER — MORPHINE SULFATE 4 MG/ML
4 INJECTION INTRAVENOUS
Status: DISCONTINUED | OUTPATIENT
Start: 2023-01-13 | End: 2023-01-15 | Stop reason: HOSPADM

## 2023-01-13 RX ORDER — PROMETHAZINE HYDROCHLORIDE 25 MG/1
12.5-25 TABLET ORAL EVERY 4 HOURS PRN
Status: DISCONTINUED | OUTPATIENT
Start: 2023-01-13 | End: 2023-01-15 | Stop reason: HOSPADM

## 2023-01-13 RX ORDER — OXYCODONE HYDROCHLORIDE 5 MG/1
2.5 TABLET ORAL
Status: DISCONTINUED | OUTPATIENT
Start: 2023-01-13 | End: 2023-01-13

## 2023-01-13 RX ORDER — PROCHLORPERAZINE EDISYLATE 5 MG/ML
5-10 INJECTION INTRAMUSCULAR; INTRAVENOUS EVERY 4 HOURS PRN
Status: DISCONTINUED | OUTPATIENT
Start: 2023-01-13 | End: 2023-01-15 | Stop reason: HOSPADM

## 2023-01-13 RX ORDER — CYCLOBENZAPRINE HCL 10 MG
TABLET ORAL
Status: ON HOLD | COMMUNITY
Start: 2022-11-14 | End: 2023-01-13

## 2023-01-13 RX ORDER — ONDANSETRON 4 MG/1
4 TABLET, ORALLY DISINTEGRATING ORAL EVERY 4 HOURS PRN
Status: DISCONTINUED | OUTPATIENT
Start: 2023-01-13 | End: 2023-01-15 | Stop reason: HOSPADM

## 2023-01-13 RX ORDER — POLYETHYLENE GLYCOL 3350 17 G/17G
1 POWDER, FOR SOLUTION ORAL
Status: DISCONTINUED | OUTPATIENT
Start: 2023-01-13 | End: 2023-01-15 | Stop reason: HOSPADM

## 2023-01-13 RX ORDER — SODIUM CHLORIDE 9 MG/ML
1000 INJECTION, SOLUTION INTRAVENOUS ONCE
Status: COMPLETED | OUTPATIENT
Start: 2023-01-13 | End: 2023-01-13

## 2023-01-13 RX ORDER — OXYCODONE HYDROCHLORIDE 5 MG/1
5 TABLET ORAL
Status: DISCONTINUED | OUTPATIENT
Start: 2023-01-13 | End: 2023-01-13

## 2023-01-13 RX ORDER — ONDANSETRON 2 MG/ML
4 INJECTION INTRAMUSCULAR; INTRAVENOUS ONCE
Status: COMPLETED | OUTPATIENT
Start: 2023-01-13 | End: 2023-01-13

## 2023-01-13 RX ORDER — METRONIDAZOLE 500 MG/100ML
500 INJECTION, SOLUTION INTRAVENOUS ONCE
Status: COMPLETED | OUTPATIENT
Start: 2023-01-13 | End: 2023-01-13

## 2023-01-13 RX ORDER — CEFTRIAXONE 2 G/1
2000 INJECTION, POWDER, FOR SOLUTION INTRAMUSCULAR; INTRAVENOUS ONCE
Status: COMPLETED | OUTPATIENT
Start: 2023-01-13 | End: 2023-01-13

## 2023-01-13 RX ORDER — OXYCODONE HYDROCHLORIDE 5 MG/1
2.5 TABLET ORAL
Status: DISCONTINUED | OUTPATIENT
Start: 2023-01-13 | End: 2023-01-15 | Stop reason: HOSPADM

## 2023-01-13 RX ORDER — ONDANSETRON 2 MG/ML
4 INJECTION INTRAMUSCULAR; INTRAVENOUS EVERY 4 HOURS PRN
Status: DISCONTINUED | OUTPATIENT
Start: 2023-01-13 | End: 2023-01-15 | Stop reason: HOSPADM

## 2023-01-13 RX ORDER — ACETAMINOPHEN 325 MG/1
650 TABLET ORAL EVERY 6 HOURS PRN
Status: DISCONTINUED | OUTPATIENT
Start: 2023-01-13 | End: 2023-01-15 | Stop reason: HOSPADM

## 2023-01-13 RX ORDER — MORPHINE SULFATE 4 MG/ML
2 INJECTION INTRAVENOUS
Status: DISCONTINUED | OUTPATIENT
Start: 2023-01-13 | End: 2023-01-13

## 2023-01-13 RX ADMIN — SODIUM CHLORIDE: 9 INJECTION, SOLUTION INTRAVENOUS at 21:00

## 2023-01-13 RX ADMIN — FAMOTIDINE 20 MG: 10 INJECTION, SOLUTION INTRAVENOUS at 18:58

## 2023-01-13 RX ADMIN — SODIUM CHLORIDE 1000 ML: 9 INJECTION, SOLUTION INTRAVENOUS at 18:53

## 2023-01-13 RX ADMIN — CEFTRIAXONE SODIUM 2000 MG: 2 INJECTION, POWDER, FOR SOLUTION INTRAMUSCULAR; INTRAVENOUS at 20:43

## 2023-01-13 RX ADMIN — KETOROLAC TROMETHAMINE 15 MG: 30 INJECTION, SOLUTION INTRAMUSCULAR; INTRAVENOUS at 18:55

## 2023-01-13 RX ADMIN — ACETAMINOPHEN 650 MG: 325 TABLET, FILM COATED ORAL at 21:57

## 2023-01-13 RX ADMIN — ONDANSETRON 4 MG: 2 INJECTION INTRAMUSCULAR; INTRAVENOUS at 19:02

## 2023-01-13 RX ADMIN — METRONIDAZOLE 500 MG: 500 INJECTION, SOLUTION INTRAVENOUS at 20:53

## 2023-01-13 ASSESSMENT — ENCOUNTER SYMPTOMS
COUGH: 0
EYE DISCHARGE: 0
MYALGIAS: 0
NAUSEA: 1
DIARRHEA: 0
CONSTIPATION: 0
VOMITING: 0
WEAKNESS: 0
EYE PAIN: 0
SENSORY CHANGE: 0
WHEEZING: 0
HEADACHES: 0
DIAPHORESIS: 0
HEMOPTYSIS: 0
DEPRESSION: 0
SORE THROAT: 0
FEVER: 0
LOSS OF CONSCIOUSNESS: 0
NECK PAIN: 0
SPUTUM PRODUCTION: 0
ABDOMINAL PAIN: 1
SPEECH CHANGE: 0
FOCAL WEAKNESS: 0
PALPITATIONS: 0
BRUISES/BLEEDS EASILY: 0
CHILLS: 0
BACK PAIN: 0
SHORTNESS OF BREATH: 0
CLAUDICATION: 0
DIZZINESS: 0

## 2023-01-13 ASSESSMENT — PAIN DESCRIPTION - DESCRIPTORS: DESCRIPTORS: ACHING

## 2023-01-13 ASSESSMENT — FIBROSIS 4 INDEX
FIB4 SCORE: 0.27
FIB4 SCORE: 0.27

## 2023-01-13 ASSESSMENT — LIFESTYLE VARIABLES: SUBSTANCE_ABUSE: 0

## 2023-01-13 ASSESSMENT — PAIN DESCRIPTION - PAIN TYPE
TYPE: ACUTE PAIN
TYPE: ACUTE PAIN

## 2023-01-13 NOTE — PROGRESS NOTES
Zhanna Edmondson is a 22 y.o. female who presents for RUQ Pain (Abdominal pain, radiating down to the side. X this morning )    Accompanied by her mother.   HPI  This is a new problem. Zhanna Edmondson is a 22 y.o. patient who presents to urgent care with c/o: pain in RUQ radiating down to RLQ and throught back and epigastric area. Pain started suddenly this morning. She had to leave work early secondary to severe 9/10 pain. Pain caused her to be doubled over today. It is sharp and intense.  Had similar pain ( but not this bad) in the past. Has never been told she has GB dz but has had some diagnostic tests but does not recall results.   Mild nausea for about 15 min.   Pain is currently coming and going.  Had to leave work because the pain was so bad. She had to call her mother to pick her up because she couldn't drive.   Has not eaten much today. Has been drinking fluids.   Treatments tried: heat pack.   Denies fever   No other aggravating or alleviating factors.       ROS See HPI    Allergies:       Allergies   Allergen Reactions    Skin Adhesives      Tape and bandaids       PMSFS Hx:  Past Medical History:   Diagnosis Date    Anxiety     Depression      History reviewed. No pertinent surgical history.  Family History   Problem Relation Age of Onset    Lung Disease Maternal Grandfather     Cancer Maternal Grandfather     Diabetes Paternal Grandfather     Hypertension Father      Social History     Tobacco Use    Smoking status: Never    Smokeless tobacco: Never   Substance Use Topics    Alcohol use: Yes     Comment: socially.        Problems:   Patient Active Problem List   Diagnosis    Anxiety and depression    Joint pain       Medications:   Current Outpatient Medications on File Prior to Visit   Medication Sig Dispense Refill    GLUCOSAMINE-CALCIUM-VIT D PO Take  by mouth.      Cholecalciferol (D3 VITAMIN PO) Take  by mouth. Pt taking 1500 units daily      Multiple Vitamin (MULTI-DAY PO)       cyclobenzaprine  "(FLEXERIL) 10 mg Tab TAKE 1 TABLET BY MOUTH THREE TIMES DAILY AS NEEDED FOR SPASM (Patient not taking: Reported on 1/13/2023)      escitalopram (LEXAPRO) 20 MG tablet Take one pill every evening (Patient not taking: Reported on 1/13/2023) 30 tablet 3     No current facility-administered medications on file prior to visit.          Objective:     /80   Pulse (!) 105   Temp 36.9 °C (98.4 °F) (Temporal)   Resp 18   Ht 1.676 m (5' 6\")   Wt 97.5 kg (215 lb)   SpO2 97%   Breastfeeding No   BMI 34.70 kg/m²     Physical Exam  Vitals and nursing note reviewed.   Constitutional:       General: She is in acute distress.      Appearance: Normal appearance. She is well-developed and well-groomed. She is not ill-appearing or toxic-appearing.   HENT:      Right Ear: Hearing normal.      Left Ear: Hearing normal.      Mouth/Throat:      Lips: Pink.      Mouth: Mucous membranes are moist.   Eyes:      General: Lids are normal.   Neck:      Trachea: Trachea and phonation normal.   Cardiovascular:      Rate and Rhythm: Normal rate and regular rhythm.      Pulses: Normal pulses.      Heart sounds: Normal heart sounds.   Pulmonary:      Effort: Pulmonary effort is normal.      Breath sounds: Normal breath sounds.   Abdominal:      Palpations: Abdomen is soft.      Tenderness: There is abdominal tenderness in the right upper quadrant and epigastric area. There is guarding.       Musculoskeletal:      Cervical back: Full passive range of motion without pain and normal range of motion.   Lymphadenopathy:      Upper Body:      Right upper body: No supraclavicular adenopathy.      Left upper body: No supraclavicular adenopathy.   Skin:     General: Skin is warm and dry.      Capillary Refill: Capillary refill takes less than 2 seconds.   Neurological:      Mental Status: She is alert and oriented to person, place, and time.   Psychiatric:         Mood and Affect: Mood normal.         Speech: Speech normal.         Behavior: " Behavior normal. Behavior is cooperative.         Thought Content: Thought content normal.     Results for orders placed or performed in visit on 01/13/23   POCT Urinalysis   Result Value Ref Range    POC Color Yellow Negative    POC Appearance Cloudy Negative    POC Leukocyte Esterase Moderate Negative    POC Nitrites Negative Negative    POC Urobiligen 0.2 Negative (0.2) mg/dL    POC Protein Negative Negative mg/dL    POC Urine PH 6.0 5.0 - 8.0    POC Blood Trace-intact Negative    POC Specific Gravity >=1.030 <1.005 - >1.030    POC Ketones Trace Negative mg/dL    POC Bilirubin Negative Negative mg/dL    POC Glucose Negative Negative mg/dL   POCT Pregnancy   Result Value Ref Range    POC Urine Pregnancy Test Negative Negative    Internal Control Positive Valid     Internal Control Negative Valid          Assessment /Associated Orders:      1. Abdominal pain, unspecified abdominal location  POCT Urinalysis    POCT Pregnancy    URINE CULTURE(NEW)          Medical Decision Making:      Pt's clinical presentation and exam today indicate a need for higher level of care with further evaluation and/or diagnostics.   [facility] was  called to arrange transfer to higher level of care in ER.  Pt is to be transported via POV with her mother.   I have reiterated to patient that although an Urgent Care to ER transfer was made this will not necessarily expedite the ER process      Please note that this dictation was created using voice recognition software. I have worked with consultants from the vendor as well as technical experts from [facility] to optimize the interface. I have made every reasonable attempt to correct obvious errors, but I expect that there are errors of grammar and possibly content that I did not discover before finalizing the note.  This note was electronically signed by provider

## 2023-01-14 ENCOUNTER — ANESTHESIA (OUTPATIENT)
Dept: SURGERY | Facility: MEDICAL CENTER | Age: 23
DRG: 419 | End: 2023-01-14
Payer: COMMERCIAL

## 2023-01-14 ENCOUNTER — ANESTHESIA EVENT (OUTPATIENT)
Dept: SURGERY | Facility: MEDICAL CENTER | Age: 23
DRG: 419 | End: 2023-01-14
Payer: COMMERCIAL

## 2023-01-14 PROBLEM — D64.9 ANEMIA: Status: ACTIVE | Noted: 2023-01-14

## 2023-01-14 PROBLEM — E66.9 OBESITY (BMI 30-39.9): Status: ACTIVE | Noted: 2023-01-14

## 2023-01-14 LAB
ALBUMIN SERPL BCP-MCNC: 3.5 G/DL (ref 3.2–4.9)
ALBUMIN/GLOB SERPL: 1.5 G/DL
ALP SERPL-CCNC: 76 U/L (ref 30–99)
ALT SERPL-CCNC: 11 U/L (ref 2–50)
ANION GAP SERPL CALC-SCNC: 10 MMOL/L (ref 7–16)
APTT PPP: 29.1 SEC (ref 24.7–36)
AST SERPL-CCNC: 11 U/L (ref 12–45)
BASOPHILS # BLD AUTO: 0.5 % (ref 0–1.8)
BASOPHILS # BLD: 0.04 K/UL (ref 0–0.12)
BILIRUB SERPL-MCNC: 0.3 MG/DL (ref 0.1–1.5)
BUN SERPL-MCNC: 13 MG/DL (ref 8–22)
CALCIUM ALBUM COR SERPL-MCNC: 8.8 MG/DL (ref 8.5–10.5)
CALCIUM SERPL-MCNC: 8.4 MG/DL (ref 8.4–10.2)
CHLORIDE SERPL-SCNC: 111 MMOL/L (ref 96–112)
CO2 SERPL-SCNC: 20 MMOL/L (ref 20–33)
CREAT SERPL-MCNC: 0.63 MG/DL (ref 0.5–1.4)
EOSINOPHIL # BLD AUTO: 0.2 K/UL (ref 0–0.51)
EOSINOPHIL NFR BLD: 2.3 % (ref 0–6.9)
ERYTHROCYTE [DISTWIDTH] IN BLOOD BY AUTOMATED COUNT: 43.8 FL (ref 35.9–50)
GFR SERPLBLD CREATININE-BSD FMLA CKD-EPI: 128 ML/MIN/1.73 M 2
GLOBULIN SER CALC-MCNC: 2.3 G/DL (ref 1.9–3.5)
GLUCOSE SERPL-MCNC: 77 MG/DL (ref 65–99)
HCT VFR BLD AUTO: 36.9 % (ref 37–47)
HGB BLD-MCNC: 11.7 G/DL (ref 12–16)
IMM GRANULOCYTES # BLD AUTO: 0.03 K/UL (ref 0–0.11)
IMM GRANULOCYTES NFR BLD AUTO: 0.3 % (ref 0–0.9)
INR PPP: 1.12 (ref 0.87–1.13)
LYMPHOCYTES # BLD AUTO: 2.94 K/UL (ref 1–4.8)
LYMPHOCYTES NFR BLD: 33.5 % (ref 22–41)
MCH RBC QN AUTO: 28.7 PG (ref 27–33)
MCHC RBC AUTO-ENTMCNC: 31.7 G/DL (ref 33.6–35)
MCV RBC AUTO: 90.4 FL (ref 81.4–97.8)
MONOCYTES # BLD AUTO: 0.58 K/UL (ref 0–0.85)
MONOCYTES NFR BLD AUTO: 6.6 % (ref 0–13.4)
NEUTROPHILS # BLD AUTO: 4.98 K/UL (ref 2–7.15)
NEUTROPHILS NFR BLD: 56.8 % (ref 44–72)
NRBC # BLD AUTO: 0 K/UL
NRBC BLD-RTO: 0 /100 WBC
PATHOLOGY CONSULT NOTE: NORMAL
PLATELET # BLD AUTO: 342 K/UL (ref 164–446)
PMV BLD AUTO: 10 FL (ref 9–12.9)
POTASSIUM SERPL-SCNC: 4.1 MMOL/L (ref 3.6–5.5)
PROT SERPL-MCNC: 5.8 G/DL (ref 6–8.2)
PROTHROMBIN TIME: 14.2 SEC (ref 12–14.6)
RBC # BLD AUTO: 4.08 M/UL (ref 4.2–5.4)
SODIUM SERPL-SCNC: 141 MMOL/L (ref 135–145)
WBC # BLD AUTO: 8.8 K/UL (ref 4.8–10.8)

## 2023-01-14 PROCEDURE — 700105 HCHG RX REV CODE 258: Performed by: ANESTHESIOLOGY

## 2023-01-14 PROCEDURE — 80053 COMPREHEN METABOLIC PANEL: CPT

## 2023-01-14 PROCEDURE — 700101 HCHG RX REV CODE 250: Performed by: HOSPITALIST

## 2023-01-14 PROCEDURE — 160048 HCHG OR STATISTICAL LEVEL 1-5: Performed by: SURGERY

## 2023-01-14 PROCEDURE — 700101 HCHG RX REV CODE 250: Performed by: ANESTHESIOLOGY

## 2023-01-14 PROCEDURE — A9270 NON-COVERED ITEM OR SERVICE: HCPCS | Performed by: ANESTHESIOLOGY

## 2023-01-14 PROCEDURE — 85025 COMPLETE CBC W/AUTO DIFF WBC: CPT

## 2023-01-14 PROCEDURE — 160009 HCHG ANES TIME/MIN: Performed by: SURGERY

## 2023-01-14 PROCEDURE — 160036 HCHG PACU - EA ADDL 30 MINS PHASE I: Performed by: SURGERY

## 2023-01-14 PROCEDURE — 700111 HCHG RX REV CODE 636 W/ 250 OVERRIDE (IP): Performed by: HOSPITALIST

## 2023-01-14 PROCEDURE — 94760 N-INVAS EAR/PLS OXIMETRY 1: CPT

## 2023-01-14 PROCEDURE — 87040 BLOOD CULTURE FOR BACTERIA: CPT

## 2023-01-14 PROCEDURE — 700111 HCHG RX REV CODE 636 W/ 250 OVERRIDE (IP): Performed by: ANESTHESIOLOGY

## 2023-01-14 PROCEDURE — 00790 ANES IPER UPR ABD NOS: CPT | Performed by: ANESTHESIOLOGY

## 2023-01-14 PROCEDURE — A9270 NON-COVERED ITEM OR SERVICE: HCPCS | Performed by: HOSPITALIST

## 2023-01-14 PROCEDURE — 700102 HCHG RX REV CODE 250 W/ 637 OVERRIDE(OP): Performed by: HOSPITALIST

## 2023-01-14 PROCEDURE — 36415 COLL VENOUS BLD VENIPUNCTURE: CPT

## 2023-01-14 PROCEDURE — 99232 SBSQ HOSP IP/OBS MODERATE 35: CPT | Performed by: INTERNAL MEDICINE

## 2023-01-14 PROCEDURE — 85610 PROTHROMBIN TIME: CPT

## 2023-01-14 PROCEDURE — 700102 HCHG RX REV CODE 250 W/ 637 OVERRIDE(OP): Performed by: SURGERY

## 2023-01-14 PROCEDURE — 160035 HCHG PACU - 1ST 60 MINS PHASE I: Performed by: SURGERY

## 2023-01-14 PROCEDURE — 0FT44ZZ RESECTION OF GALLBLADDER, PERCUTANEOUS ENDOSCOPIC APPROACH: ICD-10-PCS | Performed by: SURGERY

## 2023-01-14 PROCEDURE — 160002 HCHG RECOVERY MINUTES (STAT): Performed by: SURGERY

## 2023-01-14 PROCEDURE — 700102 HCHG RX REV CODE 250 W/ 637 OVERRIDE(OP): Performed by: ANESTHESIOLOGY

## 2023-01-14 PROCEDURE — 700105 HCHG RX REV CODE 258: Performed by: HOSPITALIST

## 2023-01-14 PROCEDURE — 85730 THROMBOPLASTIN TIME PARTIAL: CPT

## 2023-01-14 PROCEDURE — 700101 HCHG RX REV CODE 250: Performed by: SURGERY

## 2023-01-14 PROCEDURE — 700111 HCHG RX REV CODE 636 W/ 250 OVERRIDE (IP): Performed by: SURGERY

## 2023-01-14 PROCEDURE — 160029 HCHG SURGERY MINUTES - 1ST 30 MINS LEVEL 4: Performed by: SURGERY

## 2023-01-14 PROCEDURE — 770001 HCHG ROOM/CARE - MED/SURG/GYN PRIV*

## 2023-01-14 PROCEDURE — 88304 TISSUE EXAM BY PATHOLOGIST: CPT

## 2023-01-14 PROCEDURE — 160041 HCHG SURGERY MINUTES - EA ADDL 1 MIN LEVEL 4: Performed by: SURGERY

## 2023-01-14 PROCEDURE — A9270 NON-COVERED ITEM OR SERVICE: HCPCS | Performed by: SURGERY

## 2023-01-14 RX ORDER — KETOROLAC TROMETHAMINE 30 MG/ML
30 INJECTION, SOLUTION INTRAMUSCULAR; INTRAVENOUS EVERY 6 HOURS
Status: DISCONTINUED | OUTPATIENT
Start: 2023-01-14 | End: 2023-01-15 | Stop reason: HOSPADM

## 2023-01-14 RX ORDER — HYDROMORPHONE HYDROCHLORIDE 1 MG/ML
0.1 INJECTION, SOLUTION INTRAMUSCULAR; INTRAVENOUS; SUBCUTANEOUS
Status: DISCONTINUED | OUTPATIENT
Start: 2023-01-14 | End: 2023-01-14 | Stop reason: HOSPADM

## 2023-01-14 RX ORDER — SODIUM CHLORIDE, SODIUM LACTATE, POTASSIUM CHLORIDE, CALCIUM CHLORIDE 600; 310; 30; 20 MG/100ML; MG/100ML; MG/100ML; MG/100ML
INJECTION, SOLUTION INTRAVENOUS CONTINUOUS
Status: DISCONTINUED | OUTPATIENT
Start: 2023-01-14 | End: 2023-01-14 | Stop reason: HOSPADM

## 2023-01-14 RX ORDER — HYDROMORPHONE HYDROCHLORIDE 1 MG/ML
0.2 INJECTION, SOLUTION INTRAMUSCULAR; INTRAVENOUS; SUBCUTANEOUS
Status: DISCONTINUED | OUTPATIENT
Start: 2023-01-14 | End: 2023-01-14 | Stop reason: HOSPADM

## 2023-01-14 RX ORDER — BUPIVACAINE HYDROCHLORIDE AND EPINEPHRINE 5; 5 MG/ML; UG/ML
INJECTION, SOLUTION EPIDURAL; INTRACAUDAL; PERINEURAL
Status: DISCONTINUED | OUTPATIENT
Start: 2023-01-14 | End: 2023-01-14 | Stop reason: HOSPADM

## 2023-01-14 RX ORDER — MEPERIDINE HYDROCHLORIDE 25 MG/ML
12.5 INJECTION INTRAMUSCULAR; INTRAVENOUS; SUBCUTANEOUS
Status: DISCONTINUED | OUTPATIENT
Start: 2023-01-14 | End: 2023-01-14 | Stop reason: HOSPADM

## 2023-01-14 RX ORDER — HALOPERIDOL 5 MG/ML
1 INJECTION INTRAMUSCULAR
Status: DISCONTINUED | OUTPATIENT
Start: 2023-01-14 | End: 2023-01-14 | Stop reason: HOSPADM

## 2023-01-14 RX ORDER — OXYCODONE HCL 5 MG/5 ML
5 SOLUTION, ORAL ORAL
Status: COMPLETED | OUTPATIENT
Start: 2023-01-14 | End: 2023-01-14

## 2023-01-14 RX ORDER — LIDOCAINE HYDROCHLORIDE 20 MG/ML
INJECTION, SOLUTION EPIDURAL; INFILTRATION; INTRACAUDAL; PERINEURAL PRN
Status: DISCONTINUED | OUTPATIENT
Start: 2023-01-14 | End: 2023-01-14 | Stop reason: SURG

## 2023-01-14 RX ORDER — OXYCODONE HCL 5 MG/5 ML
10 SOLUTION, ORAL ORAL
Status: COMPLETED | OUTPATIENT
Start: 2023-01-14 | End: 2023-01-14

## 2023-01-14 RX ORDER — HYDROMORPHONE HYDROCHLORIDE 1 MG/ML
0.4 INJECTION, SOLUTION INTRAMUSCULAR; INTRAVENOUS; SUBCUTANEOUS
Status: DISCONTINUED | OUTPATIENT
Start: 2023-01-14 | End: 2023-01-14 | Stop reason: HOSPADM

## 2023-01-14 RX ORDER — GABAPENTIN 300 MG/1
300 CAPSULE ORAL 3 TIMES DAILY
Status: DISCONTINUED | OUTPATIENT
Start: 2023-01-14 | End: 2023-01-15 | Stop reason: HOSPADM

## 2023-01-14 RX ORDER — ROCURONIUM BROMIDE 10 MG/ML
INJECTION, SOLUTION INTRAVENOUS PRN
Status: DISCONTINUED | OUTPATIENT
Start: 2023-01-14 | End: 2023-01-14 | Stop reason: SURG

## 2023-01-14 RX ORDER — DIPHENHYDRAMINE HYDROCHLORIDE 50 MG/ML
12.5 INJECTION INTRAMUSCULAR; INTRAVENOUS
Status: DISCONTINUED | OUTPATIENT
Start: 2023-01-14 | End: 2023-01-14 | Stop reason: HOSPADM

## 2023-01-14 RX ORDER — ACETAMINOPHEN 500 MG
1000 TABLET ORAL EVERY 8 HOURS
Status: DISCONTINUED | OUTPATIENT
Start: 2023-01-14 | End: 2023-01-15 | Stop reason: HOSPADM

## 2023-01-14 RX ORDER — DEXAMETHASONE SODIUM PHOSPHATE 4 MG/ML
INJECTION, SOLUTION INTRA-ARTICULAR; INTRALESIONAL; INTRAMUSCULAR; INTRAVENOUS; SOFT TISSUE PRN
Status: DISCONTINUED | OUTPATIENT
Start: 2023-01-14 | End: 2023-01-14 | Stop reason: SURG

## 2023-01-14 RX ORDER — KETOROLAC TROMETHAMINE 30 MG/ML
INJECTION, SOLUTION INTRAMUSCULAR; INTRAVENOUS PRN
Status: DISCONTINUED | OUTPATIENT
Start: 2023-01-14 | End: 2023-01-14 | Stop reason: SURG

## 2023-01-14 RX ORDER — SODIUM CHLORIDE, SODIUM LACTATE, POTASSIUM CHLORIDE, CALCIUM CHLORIDE 600; 310; 30; 20 MG/100ML; MG/100ML; MG/100ML; MG/100ML
INJECTION, SOLUTION INTRAVENOUS
Status: DISCONTINUED | OUTPATIENT
Start: 2023-01-14 | End: 2023-01-14 | Stop reason: SURG

## 2023-01-14 RX ORDER — CEFAZOLIN SODIUM 1 G/3ML
INJECTION, POWDER, FOR SOLUTION INTRAMUSCULAR; INTRAVENOUS PRN
Status: DISCONTINUED | OUTPATIENT
Start: 2023-01-14 | End: 2023-01-14 | Stop reason: SURG

## 2023-01-14 RX ORDER — ONDANSETRON 2 MG/ML
INJECTION INTRAMUSCULAR; INTRAVENOUS PRN
Status: DISCONTINUED | OUTPATIENT
Start: 2023-01-14 | End: 2023-01-14 | Stop reason: SURG

## 2023-01-14 RX ORDER — SODIUM CHLORIDE, SODIUM LACTATE, POTASSIUM CHLORIDE, CALCIUM CHLORIDE 600; 310; 30; 20 MG/100ML; MG/100ML; MG/100ML; MG/100ML
INJECTION, SOLUTION INTRAVENOUS CONTINUOUS
Status: ACTIVE | OUTPATIENT
Start: 2023-01-14 | End: 2023-01-14

## 2023-01-14 RX ORDER — ONDANSETRON 2 MG/ML
4 INJECTION INTRAMUSCULAR; INTRAVENOUS
Status: COMPLETED | OUTPATIENT
Start: 2023-01-14 | End: 2023-01-14

## 2023-01-14 RX ORDER — MIDAZOLAM HYDROCHLORIDE 1 MG/ML
INJECTION INTRAMUSCULAR; INTRAVENOUS PRN
Status: DISCONTINUED | OUTPATIENT
Start: 2023-01-14 | End: 2023-01-14 | Stop reason: SURG

## 2023-01-14 RX ADMIN — KETOROLAC TROMETHAMINE 30 MG: 30 INJECTION, SOLUTION INTRAMUSCULAR; INTRAVENOUS at 13:51

## 2023-01-14 RX ADMIN — FENTANYL CITRATE 50 MCG: 50 INJECTION, SOLUTION INTRAMUSCULAR; INTRAVENOUS at 12:30

## 2023-01-14 RX ADMIN — ONDANSETRON 4 MG: 2 INJECTION INTRAMUSCULAR; INTRAVENOUS at 04:01

## 2023-01-14 RX ADMIN — ONDANSETRON 4 MG: 2 INJECTION INTRAMUSCULAR; INTRAVENOUS at 11:12

## 2023-01-14 RX ADMIN — ACETAMINOPHEN 1000 MG: 500 TABLET ORAL at 13:51

## 2023-01-14 RX ADMIN — FENTANYL CITRATE 50 MCG: 50 INJECTION, SOLUTION INTRAMUSCULAR; INTRAVENOUS at 11:40

## 2023-01-14 RX ADMIN — OXYCODONE HYDROCHLORIDE 10 MG: 5 SOLUTION ORAL at 12:31

## 2023-01-14 RX ADMIN — KETOROLAC TROMETHAMINE 30 MG: 30 INJECTION, SOLUTION INTRAMUSCULAR at 11:42

## 2023-01-14 RX ADMIN — METRONIDAZOLE 500 MG: 500 INJECTION, SOLUTION INTRAVENOUS at 21:11

## 2023-01-14 RX ADMIN — DEXAMETHASONE SODIUM PHOSPHATE 8 MG: 4 INJECTION, SOLUTION INTRA-ARTICULAR; INTRALESIONAL; INTRAMUSCULAR; INTRAVENOUS; SOFT TISSUE at 11:12

## 2023-01-14 RX ADMIN — CEFAZOLIN 2 G: 330 INJECTION, POWDER, FOR SOLUTION INTRAMUSCULAR; INTRAVENOUS at 11:12

## 2023-01-14 RX ADMIN — ROCURONIUM BROMIDE 50 MG: 10 INJECTION, SOLUTION INTRAVENOUS at 11:12

## 2023-01-14 RX ADMIN — MIDAZOLAM HYDROCHLORIDE 2 MG: 1 INJECTION, SOLUTION INTRAMUSCULAR; INTRAVENOUS at 11:06

## 2023-01-14 RX ADMIN — METRONIDAZOLE 500 MG: 500 INJECTION, SOLUTION INTRAVENOUS at 13:45

## 2023-01-14 RX ADMIN — OXYCODONE 2.5 MG: 5 TABLET ORAL at 04:01

## 2023-01-14 RX ADMIN — GABAPENTIN 300 MG: 300 CAPSULE ORAL at 13:51

## 2023-01-14 RX ADMIN — FENTANYL CITRATE 25 MCG: 50 INJECTION, SOLUTION INTRAMUSCULAR; INTRAVENOUS at 12:42

## 2023-01-14 RX ADMIN — ONDANSETRON 4 MG: 2 INJECTION INTRAMUSCULAR; INTRAVENOUS at 12:24

## 2023-01-14 RX ADMIN — CEFTRIAXONE SODIUM 1000 MG: 1 INJECTION, POWDER, FOR SOLUTION INTRAMUSCULAR; INTRAVENOUS at 17:49

## 2023-01-14 RX ADMIN — SODIUM CHLORIDE, POTASSIUM CHLORIDE, SODIUM LACTATE AND CALCIUM CHLORIDE: 600; 310; 30; 20 INJECTION, SOLUTION INTRAVENOUS at 11:06

## 2023-01-14 RX ADMIN — KETOROLAC TROMETHAMINE 30 MG: 30 INJECTION, SOLUTION INTRAMUSCULAR; INTRAVENOUS at 23:04

## 2023-01-14 RX ADMIN — METRONIDAZOLE 500 MG: 500 INJECTION, SOLUTION INTRAVENOUS at 05:23

## 2023-01-14 RX ADMIN — ACETAMINOPHEN 1000 MG: 500 TABLET ORAL at 21:09

## 2023-01-14 RX ADMIN — FENTANYL CITRATE 50 MCG: 50 INJECTION, SOLUTION INTRAMUSCULAR; INTRAVENOUS at 11:15

## 2023-01-14 RX ADMIN — GABAPENTIN 300 MG: 300 CAPSULE ORAL at 17:47

## 2023-01-14 RX ADMIN — SODIUM CHLORIDE: 9 INJECTION, SOLUTION INTRAVENOUS at 09:01

## 2023-01-14 RX ADMIN — LIDOCAINE HYDROCHLORIDE 100 MG: 20 INJECTION, SOLUTION EPIDURAL; INFILTRATION; INTRACAUDAL at 11:12

## 2023-01-14 RX ADMIN — KETOROLAC TROMETHAMINE 30 MG: 30 INJECTION, SOLUTION INTRAMUSCULAR; INTRAVENOUS at 17:47

## 2023-01-14 RX ADMIN — FENTANYL CITRATE 50 MCG: 50 INJECTION, SOLUTION INTRAMUSCULAR; INTRAVENOUS at 12:25

## 2023-01-14 RX ADMIN — PROMETHAZINE HYDROCHLORIDE 25 MG: 25 TABLET ORAL at 14:37

## 2023-01-14 RX ADMIN — OXYCODONE HYDROCHLORIDE 5 MG: 5 TABLET ORAL at 13:34

## 2023-01-14 RX ADMIN — PROPOFOL 200 MG: 10 INJECTION, EMULSION INTRAVENOUS at 11:12

## 2023-01-14 RX ADMIN — SUGAMMADEX 200 MG: 100 INJECTION, SOLUTION INTRAVENOUS at 11:43

## 2023-01-14 ASSESSMENT — COGNITIVE AND FUNCTIONAL STATUS - GENERAL
MOBILITY SCORE: 24
DAILY ACTIVITIY SCORE: 24
SUGGESTED CMS G CODE MODIFIER MOBILITY: CH
SUGGESTED CMS G CODE MODIFIER MOBILITY: CH
MOBILITY SCORE: 24
SUGGESTED CMS G CODE MODIFIER DAILY ACTIVITY: CH
DAILY ACTIVITIY SCORE: 24
SUGGESTED CMS G CODE MODIFIER DAILY ACTIVITY: CH

## 2023-01-14 ASSESSMENT — PATIENT HEALTH QUESTIONNAIRE - PHQ9
2. FEELING DOWN, DEPRESSED, IRRITABLE, OR HOPELESS: NOT AT ALL
2. FEELING DOWN, DEPRESSED, IRRITABLE, OR HOPELESS: NOT AT ALL
SUM OF ALL RESPONSES TO PHQ9 QUESTIONS 1 AND 2: 0
1. LITTLE INTEREST OR PLEASURE IN DOING THINGS: NOT AT ALL
SUM OF ALL RESPONSES TO PHQ9 QUESTIONS 1 AND 2: 0
1. LITTLE INTEREST OR PLEASURE IN DOING THINGS: NOT AT ALL

## 2023-01-14 ASSESSMENT — LIFESTYLE VARIABLES
TOTAL SCORE: 0
TOTAL SCORE: 0
ALCOHOL_USE: NO
EVER HAD A DRINK FIRST THING IN THE MORNING TO STEADY YOUR NERVES TO GET RID OF A HANGOVER: NO
HAVE PEOPLE ANNOYED YOU BY CRITICIZING YOUR DRINKING: NO
AVERAGE NUMBER OF DAYS PER WEEK YOU HAVE A DRINK CONTAINING ALCOHOL: 0
CONSUMPTION TOTAL: NEGATIVE
ON A TYPICAL DAY WHEN YOU DRINK ALCOHOL HOW MANY DRINKS DO YOU HAVE: 0
HAVE YOU EVER FELT YOU SHOULD CUT DOWN ON YOUR DRINKING: NO
TOTAL SCORE: 0
EVER FELT BAD OR GUILTY ABOUT YOUR DRINKING: NO
HOW MANY TIMES IN THE PAST YEAR HAVE YOU HAD 5 OR MORE DRINKS IN A DAY: 0
SUBSTANCE_ABUSE: 0

## 2023-01-14 ASSESSMENT — ENCOUNTER SYMPTOMS
HEARTBURN: 0
PALPITATIONS: 0
DIZZINESS: 0
DOUBLE VISION: 0
COUGH: 0
HEADACHES: 0
VOMITING: 0
FEVER: 0
CHILLS: 0
SHORTNESS OF BREATH: 0
NERVOUS/ANXIOUS: 0
ABDOMINAL PAIN: 1
FALLS: 0
BLURRED VISION: 0
BACK PAIN: 0

## 2023-01-14 ASSESSMENT — PAIN DESCRIPTION - PAIN TYPE
TYPE: SURGICAL PAIN
TYPE: SURGICAL PAIN
TYPE: ACUTE PAIN;SURGICAL PAIN
TYPE: ACUTE PAIN
TYPE: SURGICAL PAIN
TYPE: SURGICAL PAIN
TYPE: ACUTE PAIN

## 2023-01-14 ASSESSMENT — FIBROSIS 4 INDEX: FIB4 SCORE: 0.19

## 2023-01-14 ASSESSMENT — PAIN SCALES - PAIN ASSESSMENT IN ADVANCED DEMENTIA (PAINAD)
BODYLANGUAGE: RELAXED
CONSOLABILITY: NO NEED TO CONSOLE

## 2023-01-14 NOTE — ANESTHESIA POSTPROCEDURE EVALUATION
Patient: Zhanna Edmondson    Procedure Summary     Date: 01/14/23 Room / Location:  OR  / SURGERY [facility]    Anesthesia Start: 1106 Anesthesia Stop: 1203    Procedure: CHOLECYSTECTOMY, LAPAROSCOPIC (Abdomen) Diagnosis: (Acute cholecystitis)    Surgeons: Inocencia Carroll M.D. Responsible Provider: Tobey Gansert, M.D.    Anesthesia Type: general ASA Status: 2 - Emergent          Final Anesthesia Type: general  Last vitals  BP   Blood Pressure: 103/62    Temp   36.3 °C (97.4 °F)    Pulse   80   Resp   18    SpO2   93 %      Anesthesia Post Evaluation    Patient location during evaluation: PACU  Patient participation: complete - patient participated  Level of consciousness: awake and alert    Airway patency: patent  Anesthetic complications: no  Cardiovascular status: hemodynamically stable  Respiratory status: acceptable  Hydration status: euvolemic    PONV: none          No notable events documented.     Nurse Pain Score: 5 (NPRS)

## 2023-01-14 NOTE — CARE PLAN
The patient is Stable - Low risk of patient condition declining or worsening    Shift Goals  Clinical Goals: Pain <4\10 throughout shift  Patient Goals: rest and comfort    Progress made toward(s) clinical / shift goals:  Medication given per scale    Problem: Pain - Standard  Goal: Alleviation of pain or a reduction in pain to the patient’s comfort goal  Outcome: Progressing       Patient is not progressing towards the following goals:

## 2023-01-14 NOTE — ANESTHESIA PROCEDURE NOTES
Airway    Date/Time: 1/14/2023 11:13 AM  Performed by: Tobey Gansert, M.D.  Authorized by: Tobey Gansert, M.D.     Location:  OR  Urgency:  Elective  Indications for Airway Management:  Anesthesia      Spontaneous Ventilation: absent    Sedation Level:  Deep  Preoxygenated: Yes    Patient Position:  Sniffing  Final Airway Type:  Endotracheal airway  Final Endotracheal Airway:  ETT  Cuffed: Yes    Technique Used for Successful ETT Placement:  Direct laryngoscopy    Insertion Site:  Oral  Blade Type:  Marzena  Laryngoscope Blade/Videolaryngoscope Blade Size:  3  ETT Size (mm):  7.0  Measured from:  Teeth  ETT to Teeth (cm):  22  Placement Verified by: auscultation and capnometry    Cormack-Lehane Classification:  Grade I - full view of glottis  Number of Attempts at Approach:  1

## 2023-01-14 NOTE — PROGRESS NOTES
Med Rec Complete per patient  Allergies Reviewed with patient  No antibiotics within the last 30 days  Patient's Preferred Pharmacy:  [facility]

## 2023-01-14 NOTE — ANESTHESIA TIME REPORT
Anesthesia Start and Stop Event Times     Date Time Event    1/14/2023 1051 Ready for Procedure     1106 Anesthesia Start     1203 Anesthesia Stop        Responsible Staff  01/14/23    Name Role Begin End    Tobey Gansert, M.D. Anesth 1106 1203        Overtime Reason:  no overtime (within assigned shift)    Comments:

## 2023-01-14 NOTE — OP REPORT
DATE OF OPERATION: 1/14/2023    PREOPERATIVE DIAGNOSIS: Acute cholecystitis  POSTOPERATIVE DIAGNOSIS: Acute on chronic cholecystitis    PROCEDURE: Laparoscopic cholecystectomy    SURGEON: Inocencia Andrews MD     ANESTHESIOLOGIST: Tobey Gansert, MD with [facility]    SPECIMENS: Gallbladder and contents.     FINDINGS: Chronic adhesions of the omentum to the gallbladder wall, mild acute inflammation, stones    INDICATIONS: This is a 22 year old woman who presented with severe right upper quadrant pain that did not resolve.  We discussed definitive surgical therapy with risks including, but   not limited to bleeding, infection, damage to the common duct, possibly   requiring hepaticojejunostomy. Patient understood and agreed to proceed.     DESCRIPTION OF PROCEDURE: The patient was brought to the operating room. She   was placed in a comfortable supine position on the operating room table with   all appropriate points padded. General anesthesia was induced without   complication. A time-out was held and completed confirming correct patient,   correct site, correct procedure and SCDs on and functioning. The patient received   antibiotics prior to incision. After prepping the abdomen with ChloraPrep and   draping in usual sterile fashion, 0.25% Marcaine with epinephrine was   infiltrated supraumbilically and a 1-cm incision was made in this area. This   was taken down the fascia bluntly using a hemostat and a penetrating towel   clip was used to grasp the umbilical stalk and elevate the abdominal wall. A   Veress needle was placed into the abdomen and a saline drop test showed no   evidence of injury to bowel or vessel. I then insufflated the abdomen to a   pressure of 15 mmHg with CO2. An 11-mm trocar was placed through this   incision into the abdomen and a camera was then inserted confirming no   evidence of injury to bowel or vessel.  I then placed an 11-mm   trocar in the epigastrium and two 5-mm trocars in the right  upper quadrant   under direct visualization after appropriate numbing of the skin. I grasped   the fundus of the gallbladder lifted up over the liver edge. I then grasped the   infundibulum of the gallbladder and   retracted it laterally and circumferentially dissected the cystic duct and the   cystic artery from lateral to medial using a Maryland grasper. Once I had satisfactorily obtained the critical view of safety, I then placed   three 10-mm clips on the patient's side and one on the specimen side on the   cystic duct and divided sharply. I then placed 2 clips on the cystic artery   on the patient's side and one on the specimen side and divided sharply and   removed the gallbladder from the liver bed using Bovie electrocautery. I spilled a small amount of bile that was suctioned immediately, and did not spill any stones.  The gallbladder was then placed in an EndoCatch   bag and removed through the epigastric port incision. I then obtained   excellent hemostasis and copiously irrigated the area with warm   saline until entirely clear. I closed the fascia on the epigastric port using   a single interrupted 0 Vicryl with an Endoclose under direct visualization   and then removed the two 5-mm ports confirming good hemostasis. I closed the   fascia on the umbilical port using a single interrupted 0 Vicryl suture and   irrigated all 4 incisions. I then closed the skin using a single running 4-0   Vicryl in a subcuticular fashion. These were then dressed with dry dressings   and patient was awoken from anesthesia in good condition having tolerated the   procedure well. All counts were correct at the end of the case x2.

## 2023-01-14 NOTE — PROGRESS NOTES
"Hospital Medicine Daily Progress Note    Date of Service  1/14/2023    Chief Complaint  Zhanna Edmondson is a 22 y.o. female admitted 1/13/2023 with abdominal pain.    Hospital Course  As per chart review:  \"22 y.o. female who presented 1/13/2023 with no past medical history presented with right upper quadrant pain at 5:30 AM.  The pain continued to worsen throughout the day prompting her mother to bring her to the emergency room.\"        Interval Problem Update  1/14: Patient was seen at bedside this morning.  The patient currently NPO.  Still complaining of abdominal pain.  The patient will most likely undergo surgery today.  We appreciate further recommendations by general surgery.  Continue to monitor closely.    Of note, examination and interview done with nurse at bedside. (Maico and Madelaine)    I have discussed this patient's plan of care and discharge plan at IDT rounds today with Case Management, Nursing, Nursing leadership, and other members of the IDT team.    Consultants/Specialty  general surgery    Code Status  Full Code    Disposition  Patient is not medically cleared for discharge.   Anticipate discharge to pending clinical evolution.    Review of Systems  Review of Systems   Constitutional:  Positive for malaise/fatigue. Negative for chills and fever.   HENT:  Negative for hearing loss and nosebleeds.    Eyes:  Negative for blurred vision and double vision.   Respiratory:  Negative for cough and shortness of breath.    Cardiovascular:  Negative for chest pain and palpitations.   Gastrointestinal:  Positive for abdominal pain. Negative for heartburn and vomiting.   Genitourinary:  Negative for dysuria and urgency.   Musculoskeletal:  Negative for back pain and falls.   Skin:  Negative for itching and rash.   Neurological:  Negative for dizziness and headaches.   Psychiatric/Behavioral:  Negative for substance abuse. The patient is not nervous/anxious.    All other systems reviewed and are negative. "     Physical Exam  Temp:  [36.4 °C (97.5 °F)-36.9 °C (98.4 °F)] 36.4 °C (97.5 °F)  Pulse:  [] 83  Resp:  [16-18] 16  BP: (110-139)/(67-94) 112/83  SpO2:  [95 %-100 %] 97 %    Physical Exam  Vitals and nursing note reviewed.   Constitutional:       General: She is not in acute distress.     Appearance: She is obese.   HENT:      Head: Normocephalic and atraumatic.      Right Ear: External ear normal.      Left Ear: External ear normal.      Mouth/Throat:      Pharynx: No oropharyngeal exudate or posterior oropharyngeal erythema.   Eyes:      General:         Right eye: No discharge.         Left eye: No discharge.   Cardiovascular:      Rate and Rhythm: Normal rate and regular rhythm.      Pulses: Normal pulses.      Heart sounds: No murmur heard.    No gallop.   Pulmonary:      Effort: Pulmonary effort is normal. No respiratory distress.      Breath sounds: Normal breath sounds. No wheezing or rhonchi.   Abdominal:      General: Bowel sounds are normal. There is no distension.      Palpations: Abdomen is soft.      Tenderness: There is abdominal tenderness. There is no guarding.   Musculoskeletal:         General: No swelling or tenderness. Normal range of motion.      Cervical back: Normal range of motion and neck supple. No tenderness.   Skin:     General: Skin is warm and dry.   Neurological:      General: No focal deficit present.      Mental Status: She is alert and oriented to person, place, and time. Mental status is at baseline.      Motor: No weakness.   Psychiatric:         Mood and Affect: Mood normal.         Behavior: Behavior normal.         Thought Content: Thought content normal.         Judgment: Judgment normal.       Fluids  No intake or output data in the 24 hours ending 01/14/23 1034    Laboratory  Recent Labs     01/13/23  1747 01/14/23  0218   WBC 15.0* 8.8   RBC 4.52 4.08*   HEMOGLOBIN 13.0 11.7*   HEMATOCRIT 40.6 36.9*   MCV 89.8 90.4   MCH 28.8 28.7   MCHC 32.0* 31.7*   RDW 42.7 43.8    PLATELETCT 441 342   MPV 9.8 10.0     Recent Labs     01/13/23  1747 01/14/23  0218   SODIUM 142 141   POTASSIUM 3.9 4.1   CHLORIDE 109 111   CO2 23 20   GLUCOSE 86 77   BUN 14 13   CREATININE 0.67 0.63   CALCIUM 9.6 8.4     Recent Labs     01/14/23  0218   APTT 29.1   INR 1.12               Imaging  US-RUQ   Final Result      Partially decompressed gallbladder with mild wall thickening and no stone visualized. Differential includes chronic cholecystitis or nonfasting state           Assessment/Plan  * Cholecystitis without calculus- (present on admission)  Assessment & Plan  Patient with white count 15,000.  Upper quadrant ultrasound gallbladder mild wall thickening no stones seen.  ERP discussed with general surgeon who recommended antibiotics n.p.o. at midnight for possible surgical intervention tomorrow.  Ordered a serum lipase level.    1/14: Patient still with abdominal pain, currently NPO as she will most likely undergo surgery today, we appreciate further recommendations by general surgery.    Obesity (BMI 30-39.9)  Assessment & Plan   on lifestyle modifications  Patient will require close follow up as outpatient with PCP.    Anemia  Assessment & Plan  Most likely dilutional  No signs of bleeding  monitor         VTE prophylaxis: SCDs/TEDs    I have performed a physical exam and reviewed and updated ROS and Plan today (1/14/2023). In review of yesterday's note (1/13/2023), there are no changes except as documented above.

## 2023-01-14 NOTE — PROGRESS NOTES
Pt returned to floor from PACU. Assumed care of patient. Reviewed plan of care, reoriented patient to floor. VSS. Pt reports significant pain rated 8/10. Will medicate per MAR. Personal belongings and call light within reach. Hourly rounding in place.

## 2023-01-14 NOTE — PROGRESS NOTES
Received bedside report from night RN. Pt is A&O 4. VSS. Pt has no complaints of pain. Denies N/V as of assessment. No signs of respiratory distress noted. Educated with POC. Updated pt for the surgery schedule. Safety precaution in place. All needs met at this time.

## 2023-01-14 NOTE — ASSESSMENT & PLAN NOTE
Patient with white count 15,000.  Upper quadrant ultrasound gallbladder mild wall thickening no stones seen.  ERP discussed with general surgeon who recommended antibiotics n.p.o. at midnight for possible surgical intervention tomorrow.  Ordered a serum lipase level.    1/14: Patient still with abdominal pain, currently NPO as she will most likely undergo surgery today, we appreciate further recommendations by general surgery.

## 2023-01-14 NOTE — PROGRESS NOTES
4 Eyes Skin Assessment Completed by KAREN Julian and Ma, RN.    Head WDL  Ears WDL  Nose WDL  Mouth WDL  Neck WDL  Breast/Chest WDL  Shoulder Blades WDL  Spine WDL  (R) Arm/Elbow/Hand WDL  (L) Arm/Elbow/Hand WDL  Abdomen WDL  Groin WDL  Scrotum/Coccyx/Buttocks WDL  (R) Leg WDL  (L) Leg WDL  (R) Heel/Foot/Toe WDL  (L) Heel/Foot/Toe WDL          Devices In Places Blood Pressure Cuff      Interventions In Place N/A    Possible Skin Injury No    Pictures Uploaded Into Epic N/A  Wound Consult Placed N/A  RN Wound Prevention Protocol Ordered No

## 2023-01-14 NOTE — OR NURSING
1201: To PACU from OR via bed, sleeping, respirations spontaneous and non-labored via OPA. 4 abdominal lap sites- CDI and covered with dermabond.     1207: OPA removed.     1215: Pt asleep, not roused at this time.     1220: Pt complains of nausea, medicated per mar. Pt states pain 2/10 and tolerable.     1225: Pt states pain 8/10, medicated per mar.    1230: Pt desat on room air to 83%. Pt placed on 2L nasal cannula. Pt states nausea resolved. Pain continues to be 8/10, medicated per mar. Pt mom updated.     1245: Pt states pain 5/10, medicated per mar. No change in surgical site assessment.     1301: Pt states pain tolerable and denies nausea. Meets criteria to transfer to floor. Report given to KAREN Burkett.    1312: Transferred on O2 tank @ [facility].

## 2023-01-14 NOTE — CONSULTS
CHIEF COMPLAINT: acute cholecystitis     HISTORY OF PRESENT ILLNESS: The patient is a 22 year-old woman who presents to the Emergency Department a 1- day history of severe epigastric abdominal pain. The pain is associated with nausea and vomiting. She reports infrequent precipitation and exacerbation of symptoms with ingestion of fatty and greasy foods. She denies a family history of gallstones. The patient denies any recent or intercurrent illness. The patient denies any recent or intercurrent illness. The patient denies any history of previous abdominal surgery.    PAST MEDICAL HISTORY:  has a past medical history of Anxiety and Depression.    PAST SURGICAL HISTORY:  has no past surgical history on file.    ALLERGIES:   Allergies   Allergen Reactions    Skin Adhesives      Tape and bandaids       CURRENT MEDICATIONS:    Home Medications       Reviewed by Iesha Bob R.N. (Registered Nurse) on 01/13/23 at 8974  Med List Status: Complete     Medication Last Dose Status   Cholecalciferol (D3 VITAMIN PO) 1/12/2023 Active   escitalopram (LEXAPRO) 20 MG tablet  Active   GLUCOSAMINE-CALCIUM-VIT D PO 1/12/2023 Active   Multiple Vitamin (MULTI-DAY PO) 1/12/2023 Active                    FAMILY HISTORY: family history includes Cancer in her maternal grandfather; Diabetes in her paternal grandfather; Hypertension in her father; Lung Disease in her maternal grandfather.    SOCIAL HISTORY:  reports that she has never smoked. She has never used smokeless tobacco. She reports current alcohol use. She reports that she does not use drugs.    REVIEW OF SYSTEMS: Review of systems is remarkable for the following abdominal pain, nausea, vomiting. The remainder of the comprehensive ROS is negative with the exception of the aforementioned HPI, PMH, and PSH bullets in accordance with CMS guideline.    PHYSICAL EXAMINATION:      Constitutional:     Vital Signs: /83   Pulse 83   Temp 36.4 °C (97.5 °F) (Temporal)   Resp  "16   Ht 1.676 m (5' 6\")   Wt 97.3 kg (214 lb 8.1 oz)   SpO2 97%    General Appearance: appears stated age.  HEENT: The pupils are equal, round, and reactive to light bilaterally. The extraocular muscles are intact bilaterally.. The sclera are an icteric. Nares and oropharynx are clear.   Neck: Supple. No adenopathy.  Respiratory:   Inspection: Unlabored respirations, no intercostal retractions, paradoxical motion, or accessory muscle use.   Auscultation: normal.  Cardiovascular:   Inspection: The skin is warm and dry.  Auscultation: Regular rate and rhythm.   Peripheral Pulses: Normal.   Abdomen:  Inspection: Abdominal inspection reveals no abrasions, contusions, lacerations or penetrating wounds.   Palpation: Palpation is remarkable for moderate tenderness in the epigastric and right upper quadrant  region. No abdominal wall hernias.  Extremities:   Examination of the upper and lower extremities demonstrates no cyanosis edema or clubbing.  Neurologic:   Alert & oriented to person, time and place. Normal motor function. Normal sensory function. No focal deficits noted.    LABORATORY VALUES:   Recent Labs     01/13/23 1747 01/14/23 0218   WBC 15.0* 8.8   RBC 4.52 4.08*   HEMOGLOBIN 13.0 11.7*   HEMATOCRIT 40.6 36.9*   MCV 89.8 90.4   MCH 28.8 28.7   MCHC 32.0* 31.7*   RDW 42.7 43.8   PLATELETCT 441 342   MPV 9.8 10.0     Recent Labs     01/13/23  1747 01/14/23 0218   SODIUM 142 141   POTASSIUM 3.9 4.1   CHLORIDE 109 111   CO2 23 20   GLUCOSE 86 77   BUN 14 13   CREATININE 0.67 0.63   CALCIUM 9.6 8.4     Recent Labs     01/13/23 1747 01/14/23 0218   ASTSGOT 14 11*   ALTSGPT 15 11   TBILIRUBIN 0.2 0.3   ALKPHOSPHAT 98 76   GLOBULIN 2.9 2.3   INR  --  1.12     Recent Labs     01/14/23 0218   APTT 29.1   INR 1.12        IMAGING:   US-RUQ   Final Result      Partially decompressed gallbladder with mild wall thickening and no stone visualized. Differential includes chronic cholecystitis or nonfasting state    "       ASSESSMENT AND PLAN:     No new Assessment & Plan notes have been filed under this hospital service since the last note was generated.  Service: Surgery General      The patient has Grade I (mild) acute cholecystitis. Plan: laparoscopic cholecystectomy.    The patient will be taken to the operating room for laparoscopic cholecystectomy. The surgical conduct was discussed in detail. Potential complications including, but not limited to, infection, bleeding, damage to adjacent structures, bile duct injury, need to convert to an open procedure, and anesthetic complications were discussed. Questions were elicited and answered to the patient's satisfaction.      ____________________________________     Inocencia Carroll M.D.    DD: 1/14/2023  8:54 AM    [facility] Guidelines for Acute Cholecystitis 2018  ACS NSQIP Surgical Risk Calculator

## 2023-01-14 NOTE — ANESTHESIA PREPROCEDURE EVALUATION
Case: [id number] Date/Time: 01/14/23 1200    Procedure: CHOLECYSTECTOMY, LAPAROSCOPIC    Location: [facility] OR 02 / SURGERY [facility]    Surgeons: Inocencia Carroll M.D.          Relevant Problems   No relevant active problems       Physical Exam    Airway   Mallampati: II  TM distance: >3 FB  Neck ROM: full       Cardiovascular - normal exam  Rhythm: regular  Rate: normal  (-) murmur     Dental - normal exam           Pulmonary - normal exam  Breath sounds clear to auscultation     Abdominal    Neurological - normal exam                 Anesthesia Plan    ASA 2- EMERGENT   ASA physical status emergent criteria: acutely contaminated wound or identified infection source    Plan - general       Airway plan will be ETT          Induction: intravenous    Postoperative Plan: Postoperative administration of opioids is intended.    Pertinent diagnostic labs and testing reviewed    Informed Consent:    Anesthetic plan and risks discussed with patient.    Use of blood products discussed with: patient whom consented to blood products.

## 2023-01-14 NOTE — PROGRESS NOTES
4 Eyes Skin Assessment Completed by KAREN Nina and KAREN Burkett.    Head WDL  Ears WDL  Nose WDL  Mouth WDL  Neck WDL  Breast/Chest WDL  Shoulder Blades WDL  Spine WDL  (R) Arm/Elbow/Hand WDL  (L) Arm/Elbow/Hand WDL  Abdomen Incision x4 closed with dermabond, cdi  Groin WDL  Scrotum/Coccyx/Buttocks WDL  (R) Leg WDL  (L) Leg WDL  (R) Heel/Foot/Toe WDL  (L) Heel/Foot/Toe WDL          Devices In Places Blood Pressure Cuff and Pulse Ox      Interventions In Place Pillows and Pressure Redistribution Mattress    Possible Skin Injury No    Pictures Uploaded Into Epic N/A  Wound Consult Placed N/A  RN Wound Prevention Protocol Ordered No

## 2023-01-14 NOTE — ED TRIAGE NOTES
Pt comes in w/ mother  c/o RUQ pain that started earlier this morning around 5  comes and goes however never gone  did cause some nausea earlier   no Hx of such   pain wain's to RUQ area then down to pelvic and R back area

## 2023-01-14 NOTE — PROGRESS NOTES
Patient's mother was called and given an updated regarding the schedule of the surgery. Mother of the patient acknowledged about the information.

## 2023-01-14 NOTE — ED PROVIDER NOTES
ED Provider Note    CHIEF COMPLAINT  Chief Complaint   Patient presents with    Abdominal Pain     Started this morning  around 5:30   RUQ pain       N/V       EXTERNAL RECORDS REVIEWED  Outpatient Notes reviewed note from the [place] urgent care noting right upper quadrant abdominal pain, sent to emergency department for further evaluation and higher level of care    HPI/ROS  LIMITATION TO HISTORY   Select: : None  OUTSIDE HISTORIAN(S):  Parent mother    Zhanna Edmondson is a 22 y.o. female who presents for evaluation of right upper quadrant abdominal pain.  Symptom onset 5:30 AM.  No clear association with food.  Pain initially mild, very severe early in the afternoon, somewhat improved at this time.  Pain is sharp, localized to right upper quadrant with radiation to the right side of the mid back.  Associated nausea but no vomiting.  No fever.  No diarrhea, no dysuria, hematuria, no constipation.  Patient has had somewhat similar symptoms in the past which she relates prompted evaluation of liver and gallbladder.  No lower abdominal pain.  No particular ill contacts.    PAST MEDICAL HISTORY   has a past medical history of Anxiety and Depression.    SURGICAL HISTORY  patient denies any surgical history    FAMILY HISTORY  Family History   Problem Relation Age of Onset    Lung Disease Maternal Grandfather     Cancer Maternal Grandfather     Diabetes Paternal Grandfather     Hypertension Father      No history of gallbladder disease in immediate family  SOCIAL HISTORY  Social History     Tobacco Use    Smoking status: Never    Smokeless tobacco: Never   Vaping Use    Vaping Use: Never used   Substance and Sexual Activity    Alcohol use: Yes     Comment: socially.     Drug use: Never    Sexual activity: Not Currently       CURRENT MEDICATIONS  Home Medications       Reviewed by Karen Solitario R.N. (Registered Nurse) on 01/13/23 at 1652  Med List Status: Partial     Medication Last Dose Status   Cholecalciferol (D3  "VITAMIN PO)  Active   cyclobenzaprine (FLEXERIL) 10 mg Tab  Active   escitalopram (LEXAPRO) 20 MG tablet  Active   GLUCOSAMINE-CALCIUM-VIT D PO  Active   Multiple Vitamin (MULTI-DAY PO)  Active                    ALLERGIES  Allergies   Allergen Reactions    Skin Adhesives      Tape and bandaids       PHYSICAL EXAM  VITAL SIGNS: /67   Pulse 78   Temp 36.6 °C (97.9 °F) (Temporal)   Resp 18   Ht 1.676 m (5' 6\")   Wt 97.3 kg (214 lb 8.1 oz)   LMP 12/31/2022   SpO2 98%   BMI 34.62 kg/m²    General: Alert, mild acute distress, appears uncomfortable  Skin: Warm, dry, mildly pale, otherwise normal for ethnicity  Head: Normocephalic, atraumatic  Neck: Trachea midline, no tenderness  Eye: PERRL, normal conjunctiva, sclera anicteric  Cardiovascular: Regular rate and rhythm, No murmur, Normal peripheral perfusion  Respiratory: Lungs CTA, respirations are non-labored, breath sounds are equal  Gastrointestinal: Soft, right upper quadrant tenderness with a positive Gutierrez sign, bowel sounds mildly hypoactive.  Abdomen is nondistended.   mild right lower quadrant tenderness, no guarding, rebound, no rigidity.  Musculoskeletal: No swelling, no deformity.  No right CVA tenderness.  Neurological: Alert and oriented to person, place, time, and situation  Lymphatics: No lymphadenopathy  Psychiatric: Cooperative, appropriate mood & affect       LABS  Results for orders placed or performed during the hospital encounter of 01/13/23   CBC WITH DIFFERENTIAL   Result Value Ref Range    WBC 15.0 (H) 4.8 - 10.8 K/uL    RBC 4.52 4.20 - 5.40 M/uL    Hemoglobin 13.0 12.0 - 16.0 g/dL    Hematocrit 40.6 37.0 - 47.0 %    MCV 89.8 81.4 - 97.8 fL    MCH 28.8 27.0 - 33.0 pg    MCHC 32.0 (L) 33.6 - 35.0 g/dL    RDW 42.7 35.9 - 50.0 fL    Platelet Count 441 164 - 446 K/uL    MPV 9.8 9.0 - 12.9 fL    Neutrophils-Polys 74.00 (H) 44.00 - 72.00 %    Lymphocytes 18.60 (L) 22.00 - 41.00 %    Monocytes 5.70 0.00 - 13.40 %    Eosinophils 1.10 0.00 - " 6.90 %    Basophils 0.30 0.00 - 1.80 %    Immature Granulocytes 0.30 0.00 - 0.90 %    Nucleated RBC 0.00 /100 WBC    Neutrophils (Absolute) 11.09 (H) 2.00 - 7.15 K/uL    Lymphs (Absolute) 2.79 1.00 - 4.80 K/uL    Monos (Absolute) 0.86 (H) 0.00 - 0.85 K/uL    Eos (Absolute) 0.17 0.00 - 0.51 K/uL    Baso (Absolute) 0.04 0.00 - 0.12 K/uL    Immature Granulocytes (abs) 0.05 0.00 - 0.11 K/uL    NRBC (Absolute) 0.00 K/uL   COMP METABOLIC PANEL   Result Value Ref Range    Sodium 142 135 - 145 mmol/L    Potassium 3.9 3.6 - 5.5 mmol/L    Chloride 109 96 - 112 mmol/L    Co2 23 20 - 33 mmol/L    Anion Gap 10.0 7.0 - 16.0    Glucose 86 65 - 99 mg/dL    Bun 14 8 - 22 mg/dL    Creatinine 0.67 0.50 - 1.40 mg/dL    Calcium 9.6 8.4 - 10.2 mg/dL    AST(SGOT) 14 12 - 45 U/L    ALT(SGPT) 15 2 - 50 U/L    Alkaline Phosphatase 98 30 - 99 U/L    Total Bilirubin 0.2 0.1 - 1.5 mg/dL    Albumin 4.6 3.2 - 4.9 g/dL    Total Protein 7.5 6.0 - 8.2 g/dL    Globulin 2.9 1.9 - 3.5 g/dL    A-G Ratio 1.6 g/dL   LIPASE   Result Value Ref Range    Lipase 32 7 - 58 U/L   HCG QUAL SERUM   Result Value Ref Range    Beta-Hcg Qualitative Serum Negative Negative   CORRECTED CALCIUM   Result Value Ref Range    Correct Calcium 9.1 8.5 - 10.5 mg/dL   ESTIMATED GFR   Result Value Ref Range    GFR (CKD-EPI) 126 >60 mL/min/1.73 m 2   Leukocytosis with left shift noted    RADIOLOGY  I have independently interpreted the diagnostic imaging associated with this visit and am waiting the final reading from the radiologist.   US-RUQ   Final Result      Partially decompressed gallbladder with mild wall thickening and no stone visualized. Differential includes chronic cholecystitis or nonfasting state           COURSE & MEDICAL DECISION MAKING    ED Observation Status? Yes; I am placing the patient in to an observation status due to a diagnostic uncertainty as well as therapeutic intensity. Patient placed in observation status at 6:44 PM, 1/13/2023.  Patient treated with  "ketorolac as she declines narcotics 15 mg IV, Zofran 4 mg IV, famotidine 20 mg IV.  1 L normal saline.  Ordered metabolic work-up as well as lipase and gallbladder ultrasound.  Differentials include but not limited to cholecystitis, biliary colic, pancreatitis, renal colic    1953: Patient reassessed, pain and nausea improved.  She has returned from ultrasound, awaiting radiology report at this time.    2022: I have heard back from the surgeon Dr. Carroll.  She concurs with plan and would like the hospitalist consulted for admission.  Plan is for operative intervention in the morning.  I have ordered Rocephin 2 g IV and Flagyl 5 mg IV.    2030: I have heard back from the hospitalist Dr. Coronado who concurs with plan and accepts the patient to her service.    INITIAL ASSESSMENT AND PLAN  Care Narrative: This is a 22-year-old female who presents for evaluation of right upper quadrant abdominal pain rating to the back with associated nausea.  Pain initially mild but became quite severe today.  She does have focal tenderness primarily at the right upper quadrant with a positive Gutierrez sign.  She has leukocytosis with left shift.  Certainly high suspicion with regard to acute cholecystitis.  Clear indication for gallbladder ultrasound, this study demonstrates thickened gallbladder wall.  Given presentation otherwise clinically consistent with cholecystitis patient will be admitted, IV antibiotics initiated, surgery consulted, plan is for operative intervention in the morning.    ADDITIONAL PROBLEM LIST AND DISPOSITION  Patient Vitals for the past 24 hrs:   BP Temp Temp src Pulse Resp SpO2 Height Weight   01/13/23 1945 124/67 -- -- 78 -- 98 % -- --   01/13/23 1918 116/72 -- -- 77 -- 97 % -- --   01/13/23 1845 130/74 -- -- 90 -- 99 % -- --   01/13/23 1649 (!) 136/92 36.6 °C (97.9 °F) Temporal 97 18 100 % -- --   01/13/23 1646 -- -- -- -- -- -- 1.676 m (5' 6\") 97.3 kg (214 lb 8.1 oz)          Problem #1: Acute right upper " quadrant abdominal pain  Problem #2: Nausea    I have discussed management of the patient with the following physicians and JERAMY's: General surgery Dr. Almanza, Hospital medicine Dr. Chapin    Discussion of management with other [facility] or appropriate source(s): None     Escalation of care considered, and ultimately not performed:diagnostic imaging    Barriers to care at this time, including but not limited to:  None .     Decision tools and prescription drugs considered including, but not limited to:  None inidcated .    HYDRATION: Based on the patient's presentation of Dehydration the patient was given IV fluids. IV Hydration was used because oral hydration failed due to initially n.p.o. Upon recheck following hydration, the patient was doing better, heart rate is improving, currently 78.        DISPOSITION:  Patient will be admitted to the care of the hospitalist Dr. Curtis to medical surgical floor with Dr. Carroll of general surgery consulted      FINAL DIAGNOSIS  1. Acute cholecystitis           Electronically signed by: Luis Manuel Soto M.D., 1/13/2023 6:41 PM

## 2023-01-14 NOTE — PROGRESS NOTES
Received pt from ED ambulatory from [facility] to bed, verbalizes needs well and demonstrates use of call bell. Complains of mild pain, medication given per scale. call bell in reach    Chart check completed    Pt states that she had contact with Covid positive prior to arrival and has tested negative twice since. Pt was evaluated in ER, doctor is aware and did not order Covid testing. Best practice alert was fagged in ER

## 2023-01-14 NOTE — H&P
Hospital Medicine History & Physical Note    Date of Service  1/13/2023    Primary Care Physician  Nisha Cole P.A.-C.    Consultants  general surgery    Specialist Names: Dr. Carroll    Code Status  Full Code    Chief Complaint  Chief Complaint   Patient presents with    Abdominal Pain     Started this morning  around 5:30   RUQ pain       N/V       History of Presenting Illness  Zhanna Edmondson is a 22 y.o. female who presented 1/13/2023 with no past medical history presented with right upper quadrant pain at 5:30 AM.  The pain continued to worsen throughout the day prompting her mother to bring her to the emergency room..    I discussed the plan of care with patient.    Review of Systems  Review of Systems   Constitutional:  Negative for chills, diaphoresis, fever and malaise/fatigue.   HENT:  Negative for congestion and sore throat.    Eyes:  Negative for pain and discharge.   Respiratory:  Negative for cough, hemoptysis, sputum production, shortness of breath and wheezing.    Cardiovascular:  Negative for chest pain, palpitations, claudication and leg swelling.   Gastrointestinal:  Positive for abdominal pain and nausea. Negative for constipation, diarrhea, melena and vomiting.   Genitourinary:  Negative for dysuria, frequency and urgency.   Musculoskeletal:  Negative for back pain, joint pain, myalgias and neck pain.   Skin:  Negative for itching and rash.   Neurological:  Negative for dizziness, sensory change, speech change, focal weakness, loss of consciousness, weakness and headaches.   Endo/Heme/Allergies:  Does not bruise/bleed easily.   Psychiatric/Behavioral:  Negative for depression, substance abuse and suicidal ideas.      Past Medical History   has a past medical history of Anxiety and Depression.    Surgical History   has no past surgical history on file.     Family History  family history includes Cancer in her maternal grandfather; Diabetes in her paternal grandfather; Hypertension in her  father; Lung Disease in her maternal grandfather.   Family history reviewed with patient. There is no family history that is pertinent to the chief complaint.     Social History   reports that she has never smoked. She has never used smokeless tobacco. She reports current alcohol use. She reports that she does not use drugs.    Allergies  Allergies   Allergen Reactions    Skin Adhesives      Tape and bandaids       Medications  Prior to Admission Medications   Prescriptions Last Dose Informant Patient Reported? Taking?   Cholecalciferol (D3 VITAMIN PO) 1/12/2023 Patient Yes No   Sig: Take  by mouth. Pt taking 1500 units daily   GLUCOSAMINE-CALCIUM-VIT D PO 1/12/2023  Yes No   Sig: Take  by mouth.   Multiple Vitamin (MULTI-DAY PO) 1/12/2023 Patient Yes No   escitalopram (LEXAPRO) 20 MG tablet   No No   Sig: Take one pill every evening   Patient not taking: Reported on 1/13/2023      Facility-Administered Medications: None       Physical Exam  Temp:  [36.6 °C (97.9 °F)-36.9 °C (98.4 °F)] 36.9 °C (98.4 °F)  Pulse:  [] 87  Resp:  [16-18] 16  BP: (110-139)/(67-94) 128/94  SpO2:  [95 %-100 %] 96 %  Blood Pressure: (!) 128/94   Temperature: 36.9 °C (98.4 °F)   Pulse: 87   Respiration: 16   Pulse Oximetry: 96 %       Physical Exam  Vitals and nursing note reviewed.   Constitutional:       General: She is not in acute distress.     Appearance: Normal appearance. She is well-developed. She is not diaphoretic.   HENT:      Head: Normocephalic and atraumatic.      Nose: Nose normal.      Mouth/Throat:      Pharynx: No oropharyngeal exudate.   Eyes:      General: No scleral icterus.        Right eye: No discharge.         Left eye: No discharge.      Conjunctiva/sclera: Conjunctivae normal.      Pupils: Pupils are equal, round, and reactive to light.   Neck:      Thyroid: No thyromegaly.      Vascular: No JVD.      Trachea: No tracheal deviation.   Cardiovascular:      Rate and Rhythm: Normal rate and regular rhythm.       Heart sounds: Normal heart sounds. No murmur heard.    No friction rub. No gallop.   Pulmonary:      Effort: Pulmonary effort is normal. No respiratory distress.      Breath sounds: Normal breath sounds. No stridor. No wheezing or rales.   Chest:      Chest wall: No tenderness.   Abdominal:      General: Bowel sounds are normal. There is no distension.      Palpations: Abdomen is soft. There is no mass.      Tenderness: There is abdominal tenderness (Right upper quadrant tenderness). There is no guarding or rebound.   Musculoskeletal:         General: No tenderness. Normal range of motion.      Cervical back: Normal range of motion and neck supple.   Lymphadenopathy:      Cervical: No cervical adenopathy.   Skin:     General: Skin is warm and dry.      Findings: No erythema or rash.   Neurological:      General: No focal deficit present.      Mental Status: She is alert and oriented to person, place, and time.      Cranial Nerves: No cranial nerve deficit.      Motor: No abnormal muscle tone.      Coordination: Coordination normal.   Psychiatric:         Mood and Affect: Mood normal.         Behavior: Behavior normal.         Thought Content: Thought content normal.         Judgment: Judgment normal.       Laboratory:  Recent Labs     01/13/23  1747   WBC 15.0*   RBC 4.52   HEMOGLOBIN 13.0   HEMATOCRIT 40.6   MCV 89.8   MCH 28.8   MCHC 32.0*   RDW 42.7   PLATELETCT 441   MPV 9.8     Recent Labs     01/13/23  1747   SODIUM 142   POTASSIUM 3.9   CHLORIDE 109   CO2 23   GLUCOSE 86   BUN 14   CREATININE 0.67   CALCIUM 9.6     Recent Labs     01/13/23  1747   ALTSGPT 15   ASTSGOT 14   ALKPHOSPHAT 98   TBILIRUBIN 0.2   LIPASE 32   GLUCOSE 86         No results for input(s): NTPROBNP in the last 72 hours.      No results for input(s): TROPONINT in the last 72 hours.    Imaging:  US-RUQ   Final Result      Partially decompressed gallbladder with mild wall thickening and no stone visualized. Differential includes chronic  cholecystitis or nonfasting state          no X-Ray or EKG requiring interpretation    Assessment/Plan:  Justification for Admission Status  I anticipate this patient will require at least two midnights for appropriate medical management, necessitating inpatient admission because surgical intervention and reassessment post op.    Patient will need a Med/Surg bed on SURGICAL service .  The need is secondary to OR need.    * Cholecystitis without calculus- (present on admission)  Assessment & Plan  Patient with white count 15,000.  Upper quadrant ultrasound gallbladder mild wall thickening no stones seen.  ERP discussed with general surgeon who recommended antibiotics n.p.o. at midnight for possible surgical intervention tomorrow.  Ordered a serum lipase level.        VTE prophylaxis: SCDs/TEDs

## 2023-01-15 VITALS
HEIGHT: 66 IN | SYSTOLIC BLOOD PRESSURE: 109 MMHG | TEMPERATURE: 98.2 F | OXYGEN SATURATION: 99 % | RESPIRATION RATE: 18 BRPM | DIASTOLIC BLOOD PRESSURE: 67 MMHG | BODY MASS INDEX: 34.47 KG/M2 | HEART RATE: 82 BPM | WEIGHT: 214.51 LBS

## 2023-01-15 LAB
ALBUMIN SERPL BCP-MCNC: 3.5 G/DL (ref 3.2–4.9)
ALBUMIN/GLOB SERPL: 1.3 G/DL
ALP SERPL-CCNC: 88 U/L (ref 30–99)
ALT SERPL-CCNC: 53 U/L (ref 2–50)
ANION GAP SERPL CALC-SCNC: 9 MMOL/L (ref 7–16)
AST SERPL-CCNC: 47 U/L (ref 12–45)
BILIRUB SERPL-MCNC: 0.2 MG/DL (ref 0.1–1.5)
BUN SERPL-MCNC: 7 MG/DL (ref 8–22)
CALCIUM ALBUM COR SERPL-MCNC: 9.3 MG/DL (ref 8.5–10.5)
CALCIUM SERPL-MCNC: 8.9 MG/DL (ref 8.4–10.2)
CHLORIDE SERPL-SCNC: 109 MMOL/L (ref 96–112)
CO2 SERPL-SCNC: 21 MMOL/L (ref 20–33)
CREAT SERPL-MCNC: 0.59 MG/DL (ref 0.5–1.4)
ERYTHROCYTE [DISTWIDTH] IN BLOOD BY AUTOMATED COUNT: 43.3 FL (ref 35.9–50)
GFR SERPLBLD CREATININE-BSD FMLA CKD-EPI: 130 ML/MIN/1.73 M 2
GLOBULIN SER CALC-MCNC: 2.8 G/DL (ref 1.9–3.5)
GLUCOSE SERPL-MCNC: 110 MG/DL (ref 65–99)
HCT VFR BLD AUTO: 38.8 % (ref 37–47)
HGB BLD-MCNC: 12.2 G/DL (ref 12–16)
MCH RBC QN AUTO: 28.6 PG (ref 27–33)
MCHC RBC AUTO-ENTMCNC: 31.4 G/DL (ref 33.6–35)
MCV RBC AUTO: 91.1 FL (ref 81.4–97.8)
PLATELET # BLD AUTO: 317 K/UL (ref 164–446)
PMV BLD AUTO: 10.2 FL (ref 9–12.9)
POTASSIUM SERPL-SCNC: 4.2 MMOL/L (ref 3.6–5.5)
PROT SERPL-MCNC: 6.3 G/DL (ref 6–8.2)
RBC # BLD AUTO: 4.26 M/UL (ref 4.2–5.4)
SODIUM SERPL-SCNC: 139 MMOL/L (ref 135–145)
WBC # BLD AUTO: 12.5 K/UL (ref 4.8–10.8)

## 2023-01-15 PROCEDURE — 700111 HCHG RX REV CODE 636 W/ 250 OVERRIDE (IP): Performed by: SURGERY

## 2023-01-15 PROCEDURE — 700101 HCHG RX REV CODE 250: Performed by: HOSPITALIST

## 2023-01-15 PROCEDURE — 99239 HOSP IP/OBS DSCHRG MGMT >30: CPT | Performed by: INTERNAL MEDICINE

## 2023-01-15 PROCEDURE — 85027 COMPLETE CBC AUTOMATED: CPT

## 2023-01-15 PROCEDURE — 36415 COLL VENOUS BLD VENIPUNCTURE: CPT

## 2023-01-15 PROCEDURE — 700102 HCHG RX REV CODE 250 W/ 637 OVERRIDE(OP): Performed by: SURGERY

## 2023-01-15 PROCEDURE — A9270 NON-COVERED ITEM OR SERVICE: HCPCS | Performed by: SURGERY

## 2023-01-15 PROCEDURE — 700102 HCHG RX REV CODE 250 W/ 637 OVERRIDE(OP): Performed by: HOSPITALIST

## 2023-01-15 PROCEDURE — A9270 NON-COVERED ITEM OR SERVICE: HCPCS | Performed by: HOSPITALIST

## 2023-01-15 PROCEDURE — 80053 COMPREHEN METABOLIC PANEL: CPT

## 2023-01-15 RX ORDER — OXYCODONE HYDROCHLORIDE 5 MG/1
5 TABLET ORAL EVERY 12 HOURS PRN
Qty: 6 TABLET | Refills: 0 | Status: SHIPPED | OUTPATIENT
Start: 2023-01-15 | End: 2023-01-18

## 2023-01-15 RX ADMIN — METRONIDAZOLE 500 MG: 500 INJECTION, SOLUTION INTRAVENOUS at 05:43

## 2023-01-15 RX ADMIN — SENNOSIDES AND DOCUSATE SODIUM 2 TABLET: 50; 8.6 TABLET ORAL at 05:38

## 2023-01-15 RX ADMIN — KETOROLAC TROMETHAMINE 30 MG: 30 INJECTION, SOLUTION INTRAMUSCULAR; INTRAVENOUS at 05:39

## 2023-01-15 RX ADMIN — ACETAMINOPHEN 1000 MG: 500 TABLET ORAL at 05:38

## 2023-01-15 RX ADMIN — GABAPENTIN 300 MG: 300 CAPSULE ORAL at 05:38

## 2023-01-15 ASSESSMENT — PAIN DESCRIPTION - PAIN TYPE: TYPE: ACUTE PAIN;SURGICAL PAIN

## 2023-01-15 NOTE — PROGRESS NOTES
POD 1 lap antoinette  Doing very well.   Ok for discharge.   Follow up in 1 week [phone].    D/C instructions:    1. DIET: Upon discharge from the hospital you may resume your normal preoperative diet. Depending on how you are feeling and whether you have nausea or not, you may wish to stay with a bland diet for the first few days. However, you can advance this as quickly as you feel ready.    2. ACTIVITIES: After discharge from the hospital, you may resume full routine activities. However, there should be no heavy lifting (greater than 15 pounds) and no strenuous activities until after your follow-up visit. Otherwise, routine activities of daily living are acceptable.    3. DRIVING: You may drive whenever you are off pain medications and are able to perform the activities needed to drive, i.e. turning, bending, twisting, etc.    4. BATHING: You may get the wound wet at any time after leaving the hospital. You may shower, but do not submerge in a bath for at least a week. Dressings may come off after 48 hours, but will peel off by themselves in the next 7-10 days.    5. BOWEL FUNCTION: Constipation is common after an operation, especially with pain medications. The combination of pain medication and decreased activity level can cause constipation in otherwise normal patients. If you feel this is occurring, take a laxative (Milk of Magnesia, Ex-Lax, Senokot, etc.) until the problem has resolved.    6. PAIN MEDICATION: You will be given a prescription for pain medication at discharge. Please take these as directed. It is important to remember not to take medications on an empty stomach as this may cause nausea.    7.CALL IF YOU HAVE: (1) Fevers to more than 101F, (2) Unusual chest or leg pain, (3) Drainage or fluid from incision that may be foul smelling, increased tenderness or soreness at the wound or the wound edges are no longer together, redness or swelling at the incision site. Please do not hesitate to call  with any other questions.     8. APPOINTMENT: Contact our office at [phone] for a follow-up appointment in 1 week following your procedure.    If you have any additional questions, please do not hesitate to call the office and speak to either myself or the physician on call.    Office address:  [address] [place] [address] [address]    Inocencia Andrews MD  [facility]  [phone]

## 2023-01-15 NOTE — CARE PLAN
Problem: Pain - Standard  Goal: Alleviation of pain or a reduction in pain to the patient’s comfort goal  Description: Target End Date:  Prior to discharge or change in level of care    Document on Vitals flowsheet    1.  Document pain using the appropriate pain scale per order or unit policy  2.  Educate and implement non-pharmacologic comfort measures (i.e. relaxation, distraction, massage, cold/heat therapy, etc.)  3.  Pain management medications as ordered  4.  Reassess pain after pain med administration per policy  5.  If opiods administered assess patient's response to pain medication is appropriate per POSS sedation scale  6.  Follow pain management plan developed in collaboration with patient and interdisciplinary team (including palliative care or pain specialists if applicable)  Outcome: Progressing   The patient is Stable - Low risk of patient condition declining or worsening    Shift Goals  Clinical Goals: Pt will be able to tolerate the surgery well today. Pain control throughout the shift, with a scale of 3/10.  Patient Goals: tolerate the surgery    Progress made toward(s) clinical / shift goals:  Pt was able to tolerate the surgery very well today. Patient reported pain and given with the medication per Mar. Patient reported nausea and reported with relief after given with the medication.     Patient is not progressing towards the following goals: n/a

## 2023-01-15 NOTE — PROGRESS NOTES
Received report from nightshift RN and assumed care of patient at 0700. Patient is eating breakfast and denies needs at this time. 4 abdominal lap sites CDI. Call light in reach. Bed in lowest locked position. Hourly rounding to continue.

## 2023-01-15 NOTE — DISCHARGE SUMMARY
"Discharge Summary    CHIEF COMPLAINT ON ADMISSION  Chief Complaint   Patient presents with    Abdominal Pain     Started this morning  around 5:30   RUQ pain       N/V       Reason for Admission  URQ Pain     Admission Date  1/13/2023    CODE STATUS  Full Code    HPI & HOSPITAL COURSE  As per chart review:  \"22 y.o. female who presented 1/13/2023 with no past medical history presented with right upper quadrant pain at 5:30 AM.  The pain continued to worsen throughout the day prompting her mother to bring her to the emergency room.\"    The patient was diagnosed with cholecystitis and started on antibiotics.  General surgery was consulted and the patient underwent laparoscopic cholecystectomy without reported complications.    For surgery the patient did very well, she is now tolerating p.o., passing gas, the pain is much better.    I did discuss the case with Dr. Carroll as the patient had slight elevation on her white blood cell count from yesterday, however this is most likely reactive.  General surgery does not recommend further antibiotic treatment.  We will not discharge patient on antibiotics.    The patient was seen at bedside, currently not complaining of overt pain.  Tolerating p.o. and she would like to go home.    We will discharge patient home.  She will require close follow-up with PCP and general surgery as an outpatient.    Therefore, she is discharged in fair and stable condition to home with close outpatient follow-up.    The patient met 2-midnight criteria for an inpatient stay at the time of discharge.    Discharge Date  01/15/2023    FOLLOW UP ITEMS POST DISCHARGE  Patient will Follow-up with PCP and general surgery as an outpatient.    DISCHARGE DIAGNOSES  Principal Problem:    Cholecystitis without calculus POA: Yes  Active Problems:    Anemia POA: Unknown    Obesity (BMI 30-39.9) POA: Unknown  Resolved Problems:    * No resolved hospital problems. *      FOLLOW UP  No future " appointments.  Primary Care Provider    Schedule an appointment as soon as possible for a visit      Inocencia Carroll M.D.  [address] [facility]  [place] [id number] [phone]    Schedule an appointment as soon as possible for a visit        MEDICATIONS ON DISCHARGE     Medication List        START taking these medications        Instructions   oxyCODONE immediate-release 5 MG Tabs  Commonly known as: ROXICODONE   Take 1 Tablet by mouth every 12 hours as needed for Severe Pain for up to 3 days.  Dose: 5 mg            CONTINUE taking these medications        Instructions   D3 VITAMIN PO   Take  by mouth. Pt taking 1500 units daily     GLUCOSAMINE-CALCIUM-VIT D PO   Take  by mouth.     MULTI-DAY PO               Allergies  Allergies   Allergen Reactions    Skin Adhesives Rash     Tape and bandaids  Tegaderm, ok       DIET  Orders Placed This Encounter   Procedures    Diet Order Diet: Cardiac     Standing Status:   Standing     Number of Occurrences:   1     Order Specific Question:   Diet:     Answer:   Cardiac [6]       ACTIVITY  As tolerated.  Weight bearing as tolerated    CONSULTATIONS  General Surgery    PROCEDURES  Laparoscopic cholecystectomy      US-RUQ   Final Result      Partially decompressed gallbladder with mild wall thickening and no stone visualized. Differential includes chronic cholecystitis or nonfasting state           LABORATORY  Lab Results   Component Value Date    SODIUM 139 01/15/2023    POTASSIUM 4.2 01/15/2023    CHLORIDE 109 01/15/2023    CO2 21 01/15/2023    GLUCOSE 110 (H) 01/15/2023    BUN 7 (L) 01/15/2023    CREATININE 0.59 01/15/2023        Lab Results   Component Value Date    WBC 12.5 (H) 01/15/2023    HEMOGLOBIN 12.2 01/15/2023    HEMATOCRIT 38.8 01/15/2023    PLATELETCT 317 01/15/2023        Total time of the discharge process exceeds 35 minutes.

## 2023-01-15 NOTE — PROGRESS NOTES
Discharging patient home per MD order.  Pt demonstrated understanding of discharge instructions, follow up appointments, home medications, prescriptions, and home care for lap sites. Pt is voiding without difficulty, pain well controlled, tolerating oral medications, O2 greater than 90% on room air.  Pt verbalized understanding of discharge instructions and educational handouts and all questions answered.  Pt discharged off unit with hospital escort.

## 2023-01-15 NOTE — CARE PLAN
The patient is Stable - Low risk of patient condition declining or worsening    Shift Goals  Clinical Goals: pt will not sustain a fall this shift.  Patient Goals: sleep comfortably    Progress made toward(s) clinical / shift goals:      Pt placed on low fall precautions. No falls this shift.    Problem: Pain - Standard  Goal: Alleviation of pain or a reduction in pain to the patient’s comfort goal  Outcome: Progressing     Problem: Knowledge Deficit - Standard  Goal: Patient and family/care givers will demonstrate understanding of plan of care, disease process/condition, diagnostic tests and medications  Outcome: Progressing     Problem: Neuro Status  Goal: Neuro status will remain stable or improve  Outcome: Progressing     Problem: Respiratory  Goal: Patient will achieve/maintain optimum respiratory ventilation and gas exchange  Outcome: Progressing       Patient is not progressing towards the following goals:

## 2023-01-15 NOTE — PROGRESS NOTES
Pt is resting in bed. Awakes to voice. Pt c/o 4/10 abdominal pain at this time. Pt states this is tolerable. Abdominal incision sites are clean, dry, and intact. Pt has ambulated 50ft and voided post-op. RN discussed POC with pt for the evening. All questions answered. Fall precautions in place.

## 2023-01-16 LAB
BACTERIA UR CULT: NORMAL
SIGNIFICANT IND 70042: NORMAL
SITE SITE: NORMAL
SOURCE SOURCE: NORMAL

## 2023-01-19 LAB
BACTERIA BLD CULT: NORMAL
BACTERIA BLD CULT: NORMAL
SIGNIFICANT IND 70042: NORMAL
SIGNIFICANT IND 70042: NORMAL
SITE SITE: NORMAL
SITE SITE: NORMAL
SOURCE SOURCE: NORMAL
SOURCE SOURCE: NORMAL

## 2023-10-02 ENCOUNTER — OFFICE VISIT (OUTPATIENT)
Dept: URGENT CARE | Facility: CLINIC | Age: 23
End: 2023-10-02
Payer: COMMERCIAL

## 2023-10-02 VITALS
SYSTOLIC BLOOD PRESSURE: 112 MMHG | OXYGEN SATURATION: 100 % | HEART RATE: 100 BPM | RESPIRATION RATE: 12 BRPM | WEIGHT: 208 LBS | HEIGHT: 65 IN | BODY MASS INDEX: 34.66 KG/M2 | DIASTOLIC BLOOD PRESSURE: 68 MMHG | TEMPERATURE: 97.8 F

## 2023-10-02 DIAGNOSIS — R10.13 EPIGASTRIC PAIN: ICD-10-CM

## 2023-10-02 LAB
APPEARANCE UR: CLEAR
BILIRUB UR STRIP-MCNC: NEGATIVE MG/DL
COLOR UR AUTO: YELLOW
GLUCOSE UR STRIP.AUTO-MCNC: NEGATIVE MG/DL
KETONES UR STRIP.AUTO-MCNC: NORMAL MG/DL
LEUKOCYTE ESTERASE UR QL STRIP.AUTO: NEGATIVE
NITRITE UR QL STRIP.AUTO: NEGATIVE
PH UR STRIP.AUTO: 5.5 [PH] (ref 5–8)
POCT INT CON NEG: NEGATIVE
POCT INT CON POS: POSITIVE
POCT URINE PREGNANCY TEST: NEGATIVE
PROT UR QL STRIP: NEGATIVE MG/DL
RBC UR QL AUTO: NEGATIVE
SP GR UR STRIP.AUTO: >=1.03
UROBILINOGEN UR STRIP-MCNC: 0.2 MG/DL

## 2023-10-02 PROCEDURE — 81025 URINE PREGNANCY TEST: CPT | Performed by: NURSE PRACTITIONER

## 2023-10-02 PROCEDURE — 99214 OFFICE O/P EST MOD 30 MIN: CPT | Performed by: NURSE PRACTITIONER

## 2023-10-02 PROCEDURE — 3078F DIAST BP <80 MM HG: CPT | Performed by: NURSE PRACTITIONER

## 2023-10-02 PROCEDURE — 3074F SYST BP LT 130 MM HG: CPT | Performed by: NURSE PRACTITIONER

## 2023-10-02 PROCEDURE — 81002 URINALYSIS NONAUTO W/O SCOPE: CPT | Performed by: NURSE PRACTITIONER

## 2023-10-02 RX ORDER — FAMOTIDINE 20 MG/1
20 TABLET, FILM COATED ORAL 2 TIMES DAILY
Qty: 60 TABLET | Refills: 0 | Status: SHIPPED | OUTPATIENT
Start: 2023-10-02

## 2023-10-02 RX ORDER — PHENTERMINE HYDROCHLORIDE 37.5 MG/1
37.5 TABLET ORAL EVERY MORNING
COMMUNITY
Start: 2023-08-01

## 2023-10-02 RX ORDER — SUCRALFATE 1 G/1
1 TABLET ORAL
Qty: 120 TABLET | Refills: 3 | Status: SHIPPED | OUTPATIENT
Start: 2023-10-02

## 2023-10-02 ASSESSMENT — ENCOUNTER SYMPTOMS
FEVER: 0
DIZZINESS: 0
SORE THROAT: 0
BLOOD IN STOOL: 0
CONSTIPATION: 0
COUGH: 0
ABDOMINAL PAIN: 1
VOMITING: 0
HEADACHES: 0
DIARRHEA: 0
NAUSEA: 1
CHILLS: 0

## 2023-10-02 ASSESSMENT — FIBROSIS 4 INDEX: FIB4 SCORE: 0.47

## 2023-10-02 NOTE — PROGRESS NOTES
Subjective:   Zhanna Edmondson is a 22 y.o. female who presents for Abdominal Pain (Patient states pain for about 2 and half weeks. States 2 and a half inches from belly button. States pain by her ribs. Gallbladder removed this year. )      Abdominal Pain  This is a new problem. Episode onset: 2.5 weeks. The onset quality is undetermined. The problem occurs constantly. The problem has been unchanged. The pain is located in the epigastric region. The pain is moderate. The quality of the pain is sharp. The abdominal pain radiates to the epigastric region. Associated symptoms include nausea. Pertinent negatives include no constipation, diarrhea, dysuria, fever, headaches or vomiting. Nothing aggravates the pain. The pain is relieved by Nothing. She has tried nothing for the symptoms. The treatment provided no relief. Prior diagnostic workup includes surgery. Her past medical history is significant for abdominal surgery.       Review of Systems   Constitutional:  Negative for chills, fever and malaise/fatigue.   HENT:  Negative for sore throat.    Respiratory:  Negative for cough.    Cardiovascular:  Negative for chest pain.   Gastrointestinal:  Positive for abdominal pain and nausea. Negative for blood in stool, constipation, diarrhea and vomiting.   Genitourinary:  Negative for dysuria.   Skin:  Negative for rash.   Neurological:  Negative for dizziness and headaches.       Medications:    D3 VITAMIN PO  famotidine Tabs  phentermine  sucralfate Tabs    Allergies: Skin adhesives    Problem List: Zhanna Edmondson does not have any pertinent problems on file.    Surgical History:  Past Surgical History:   Procedure Laterality Date    ZEKE BY LAPAROSCOPY N/A 1/14/2023    Procedure: CHOLECYSTECTOMY, LAPAROSCOPIC;  Surgeon: Inocencia Carroll M.D.;  Location: SURGERY [facility];  Service: General       Past Social Hx: Zhanna Edmondson  reports that she has never smoked. She has never used smokeless tobacco. She reports  "current alcohol use. She reports that she does not use drugs.     Past Family Hx:  Zhanna Edmondson family history includes Cancer in her maternal grandfather; Diabetes in her paternal grandfather; Hypertension in her father; Lung Disease in her maternal grandfather.     Problem list, medications, and allergies reviewed by myself today in Epic.     Objective:     /68 (BP Location: Left arm, Patient Position: Sitting, BP Cuff Size: Adult long)   Pulse 100   Temp 36.6 °C (97.8 °F)   Resp 12   Ht 1.651 m (5' 5\")   Wt 94.3 kg (208 lb)   SpO2 100%   BMI 34.61 kg/m²     Physical Exam  Vitals and nursing note reviewed.   Constitutional:       General: She is not in acute distress.     Appearance: She is well-developed.   HENT:      Head: Normocephalic and atraumatic.      Right Ear: External ear normal.      Left Ear: External ear normal.      Nose: Nose normal.      Mouth/Throat:      Mouth: Mucous membranes are moist.   Eyes:      Conjunctiva/sclera: Conjunctivae normal.   Cardiovascular:      Rate and Rhythm: Normal rate.   Pulmonary:      Effort: Pulmonary effort is normal. No respiratory distress.      Breath sounds: Normal breath sounds.   Abdominal:      General: Bowel sounds are normal. There is no distension.      Tenderness: There is abdominal tenderness in the epigastric area. There is no right CVA tenderness, left CVA tenderness, guarding or rebound. Negative signs include Gutierrez's sign, Rovsing's sign, McBurney's sign, psoas sign and obturator sign.      Comments: Well-healed surgical scar epigastric region   Musculoskeletal:         General: Normal range of motion.   Skin:     General: Skin is warm and dry.   Neurological:      General: No focal deficit present.      Mental Status: She is alert and oriented to person, place, and time. Mental status is at baseline.      Gait: Gait (gait at baseline) normal.   Psychiatric:         Judgment: Judgment normal.         Assessment/Plan:     Diagnosis and " associated orders:   1. Epigastric pain  sucralfate (CARAFATE) 1 GM Tab    famotidine (PEPCID) 20 MG Tab    POCT Urinalysis    POCT Pregnancy    Referral to Gastroenterology             Comments/MDM:     I personally reviewed prior external notes and prior test results pertinent to today's visit.  Patient with no acute rebound and or guarding, has a history of cholecystectomy 2 years ago afebrile discussed differentials.  Will trial Rx medication patient will follow-up with gastroenterology for further evaluation management  Discussed management options, risks and benefits, and alternatives to treatment plan agreed upon.   Red flags discussed and indications to immediately call 911 or present to the Emergency Department.   Supportive care, differential diagnoses, and indications for immediate follow-up discussed with patient.    Patient expresses understanding and agrees to plan. Patient denies any other questions or concerns.              Please note that this dictation was created using voice recognition software. I have made a reasonable attempt to correct obvious errors, but I expect that there are errors of grammar and possibly content that I did not discover before finalizing the note.    This note was electronically signed by Amado TRAYLOR.

## 2023-11-14 ENCOUNTER — HOSPITAL ENCOUNTER (OUTPATIENT)
Dept: RADIOLOGY | Facility: MEDICAL CENTER | Age: 23
End: 2023-11-14
Payer: COMMERCIAL

## 2023-11-14 DIAGNOSIS — R10.9 ABDOMINAL PAIN, UNSPECIFIED ABDOMINAL LOCATION: ICD-10-CM

## 2023-11-14 DIAGNOSIS — R10.13 EPIGASTRIC PAIN: ICD-10-CM

## 2023-11-14 PROCEDURE — 76700 US EXAM ABDOM COMPLETE: CPT

## 2023-11-14 RX ORDER — FAMOTIDINE 20 MG/1
20 TABLET, FILM COATED ORAL 2 TIMES DAILY
Qty: 60 TABLET | Refills: 0 | OUTPATIENT
Start: 2023-11-14

## 2024-04-21 ENCOUNTER — OFFICE VISIT (OUTPATIENT)
Dept: URGENT CARE | Facility: PHYSICIAN GROUP | Age: 24
End: 2024-04-21
Payer: COMMERCIAL

## 2024-04-21 VITALS
TEMPERATURE: 98.7 F | BODY MASS INDEX: 33.55 KG/M2 | WEIGHT: 208.78 LBS | HEIGHT: 66 IN | DIASTOLIC BLOOD PRESSURE: 74 MMHG | SYSTOLIC BLOOD PRESSURE: 108 MMHG | HEART RATE: 129 BPM | OXYGEN SATURATION: 99 % | RESPIRATION RATE: 16 BRPM

## 2024-04-21 DIAGNOSIS — R00.2 PALPITATIONS: ICD-10-CM

## 2024-04-21 PROCEDURE — 3074F SYST BP LT 130 MM HG: CPT | Performed by: PHYSICIAN ASSISTANT

## 2024-04-21 PROCEDURE — 93000 ELECTROCARDIOGRAM COMPLETE: CPT | Performed by: PHYSICIAN ASSISTANT

## 2024-04-21 PROCEDURE — 99214 OFFICE O/P EST MOD 30 MIN: CPT | Performed by: PHYSICIAN ASSISTANT

## 2024-04-21 PROCEDURE — 3078F DIAST BP <80 MM HG: CPT | Performed by: PHYSICIAN ASSISTANT

## 2024-04-21 RX ORDER — ESCITALOPRAM OXALATE 20 MG/1
20 TABLET ORAL
COMMUNITY

## 2024-04-21 ASSESSMENT — ENCOUNTER SYMPTOMS
EYE PAIN: 0
MYALGIAS: 0
CONSTIPATION: 0
PALPITATIONS: 1
DIARRHEA: 0
COUGH: 0
FEVER: 0
CHILLS: 0
NAUSEA: 0
SORE THROAT: 0
SHORTNESS OF BREATH: 1
ABDOMINAL PAIN: 0
VOMITING: 0
HEADACHES: 0

## 2024-04-21 ASSESSMENT — FIBROSIS 4 INDEX: FIB4 SCORE: 0.49

## 2024-04-21 NOTE — PROGRESS NOTES
"Subjective:   Zhanna Edmondson is a 23 y.o. female who presents for Heart Problem (Fluctuating high NJ, dizziness, having trouble catching her breathe xthis morning. Low O2 and chest pain ~1 hour ago. Highest NJ was ~160-170, notes her pulse is typically elevated. )      Since early last week has noted some joint swelling, malaise and fatigue. In the past she's been suspected of having fibromyalgia so assumed this to be the case.  4 days ago the patient was at work and felt \"flushed\".  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 The following day she had joint pain.  Since then has been monitoring her pulse which has been 55-80 at rest, and with mild exertion heart rate jumped to 165 or so. Which normalized with rest.  In the car today she she noted some chest pain and noted pulse ox in the 80s, when walking was fluctuating from 82-94% with some associated lightheaded dizziness. Improves with rest.  Chest pain has resolved but noted some mild headache.  No known heart or lung problems. No history of asthma. Takes lexapro daily, denies current anxiety. Was on phentermine but stopped around 2 weeks ago.     Review of Systems   Constitutional:  Negative for chills and fever.   HENT:  Negative for congestion, ear pain and sore throat.    Eyes:  Negative for pain.   Respiratory:  Positive for shortness of breath. Negative for cough.    Cardiovascular:  Positive for chest pain and palpitations.   Gastrointestinal:  Negative for abdominal pain, constipation, diarrhea, nausea and vomiting.   Genitourinary:  Negative for dysuria.   Musculoskeletal:  Positive for joint pain. Negative for myalgias.   Skin:  Negative for rash.   Neurological:  Negative for headaches.       Medications, Allergies, and current problem list reviewed today in Epic.     Objective:     /74 (BP Location: Right arm, Patient Position: Sitting, BP Cuff Size: Adult)   Pulse (!) 129   Temp 37.1 °C (98.7 °F) " "(Temporal)   Resp 16   Ht 1.676 m (5' 6\")   Wt 94.7 kg (208 lb 12.4 oz)   SpO2 99%     Physical Exam  Vitals reviewed.   Constitutional:       Appearance: Normal appearance.   HENT:      Head: Normocephalic and atraumatic.      Right Ear: External ear normal.      Left Ear: External ear normal.      Nose: Nose normal.      Mouth/Throat:      Mouth: Mucous membranes are moist.   Eyes:      Conjunctiva/sclera: Conjunctivae normal.   Cardiovascular:      Rate and Rhythm: Normal rate.   Pulmonary:      Effort: Pulmonary effort is normal.   Skin:     General: Skin is warm and dry.      Capillary Refill: Capillary refill takes less than 2 seconds.   Neurological:      Mental Status: She is alert and oriented to person, place, and time.         Assessment/Plan:     Diagnosis and associated orders:     1. Palpitations  EKG - Clinic Performed    REFERRAL TO CARDIOLOGY         Comments/MDM:     Orthostatic vital signs show no significant abnormality, she is mildly symptomatic upon standing however.  I walked with her around clinic with a pulse oximeter attached and her heart rate escalated into the 1 15-1 20 range.  She became symptomatic with some dizziness and palpating her radial pulse there did seem to be some ectopic beats or abnormal beats which normalized as soon as she used it still and took some deep breaths.  She does not appear to have these vital signs or symptoms abnormal when resting.  With her I discussed that it may be wise to present to the emergency room given that she is grossly symptomatic with any exertional whatsoever.  She feels confident she is able to manage her symptoms and would prefer outpatient follow-up with cardiology and as a result I placed an urgent referral to cardiology.  I did encourage her that if she is not getting present to the ER immediately that she does consider going if she has any worsening symptoms  EKG unchanged from previous with sinus rate around 80, normal intervals, " normal axis no ectopic beats         Differential diagnosis, natural history, supportive care, and indications for immediate follow-up discussed.    Advised the patient to follow-up with the primary care physician for recheck, reevaluation, and consideration of further management.    Please note that this dictation was created using voice recognition software. I have made a reasonable attempt to correct obvious errors, but I expect that there are errors of grammar and possibly content that I did not discover before finalizing the note.    This note was electronically signed by Jameson Grady PA-C

## 2024-04-21 NOTE — LETTER
April 21, 2024    To Whom It May Concern:         This is confirmation that Zhanna Edmondson attended her scheduled appointment with Jameson Grady P.A.-C. on 4/21/24. Please excuse patient from work for 1-5 days to recover for medical reasons. If she feels capable she is cleared to return but depending on symptoms may need to take frequent breaks or be placed on light duty. I have referred her to a specialist for further evaluation.         If you have any questions please do not hesitate to call me at the phone number listed below.  For FMLA or long term recommendations this must be done through her primary care provider.     Sincerely,          Jameson Grady P.A.-C.  [phone]

## 2024-04-26 ENCOUNTER — HOSPITAL ENCOUNTER (EMERGENCY)
Facility: MEDICAL CENTER | Age: 24
End: 2024-04-26
Attending: EMERGENCY MEDICINE
Payer: COMMERCIAL

## 2024-04-26 ENCOUNTER — APPOINTMENT (OUTPATIENT)
Dept: RADIOLOGY | Facility: MEDICAL CENTER | Age: 24
End: 2024-04-26
Attending: EMERGENCY MEDICINE
Payer: COMMERCIAL

## 2024-04-26 VITALS
BODY MASS INDEX: 34.12 KG/M2 | RESPIRATION RATE: 14 BRPM | TEMPERATURE: 97.5 F | WEIGHT: 212.3 LBS | HEART RATE: 88 BPM | HEIGHT: 66 IN | OXYGEN SATURATION: 96 % | SYSTOLIC BLOOD PRESSURE: 128 MMHG | DIASTOLIC BLOOD PRESSURE: 83 MMHG

## 2024-04-26 DIAGNOSIS — R00.2 PALPITATIONS: ICD-10-CM

## 2024-04-26 DIAGNOSIS — R07.9 CHEST PAIN, UNSPECIFIED TYPE: ICD-10-CM

## 2024-04-26 LAB
ALBUMIN SERPL BCP-MCNC: 4.4 G/DL (ref 3.2–4.9)
ALBUMIN/GLOB SERPL: 1.5 G/DL
ALP SERPL-CCNC: 98 U/L (ref 30–99)
ALT SERPL-CCNC: 15 U/L (ref 2–50)
ANION GAP SERPL CALC-SCNC: 14 MMOL/L (ref 7–16)
APPEARANCE UR: ABNORMAL
AST SERPL-CCNC: 22 U/L (ref 12–45)
BACTERIA #/AREA URNS HPF: ABNORMAL /HPF
BASOPHILS # BLD AUTO: 0.5 % (ref 0–1.8)
BASOPHILS # BLD: 0.06 K/UL (ref 0–0.12)
BILIRUB SERPL-MCNC: 0.2 MG/DL (ref 0.1–1.5)
BILIRUB UR QL STRIP.AUTO: NEGATIVE
BUN SERPL-MCNC: 11 MG/DL (ref 8–22)
CALCIUM ALBUM COR SERPL-MCNC: 8.7 MG/DL (ref 8.5–10.5)
CALCIUM SERPL-MCNC: 9 MG/DL (ref 8.5–10.5)
CHLORIDE SERPL-SCNC: 108 MMOL/L (ref 96–112)
CO2 SERPL-SCNC: 21 MMOL/L (ref 20–33)
COLOR UR: YELLOW
CREAT SERPL-MCNC: 0.65 MG/DL (ref 0.5–1.4)
D DIMER PPP IA.FEU-MCNC: 0.47 UG/ML (FEU) (ref 0–0.5)
EKG IMPRESSION: NORMAL
EOSINOPHIL # BLD AUTO: 0.21 K/UL (ref 0–0.51)
EOSINOPHIL NFR BLD: 1.6 % (ref 0–6.9)
EPI CELLS #/AREA URNS HPF: ABNORMAL /HPF
ERYTHROCYTE [DISTWIDTH] IN BLOOD BY AUTOMATED COUNT: 43.4 FL (ref 35.9–50)
GFR SERPLBLD CREATININE-BSD FMLA CKD-EPI: 126 ML/MIN/1.73 M 2
GLOBULIN SER CALC-MCNC: 2.9 G/DL (ref 1.9–3.5)
GLUCOSE SERPL-MCNC: 86 MG/DL (ref 65–99)
GLUCOSE UR STRIP.AUTO-MCNC: NEGATIVE MG/DL
HCG SERPL QL: NEGATIVE
HCT VFR BLD AUTO: 41.8 % (ref 37–47)
HGB BLD-MCNC: 13.6 G/DL (ref 12–16)
HYALINE CASTS #/AREA URNS LPF: ABNORMAL /LPF
IMM GRANULOCYTES # BLD AUTO: 0.04 K/UL (ref 0–0.11)
IMM GRANULOCYTES NFR BLD AUTO: 0.3 % (ref 0–0.9)
KETONES UR STRIP.AUTO-MCNC: ABNORMAL MG/DL
LEUKOCYTE ESTERASE UR QL STRIP.AUTO: ABNORMAL
LIPASE SERPL-CCNC: 27 U/L (ref 11–82)
LYMPHOCYTES # BLD AUTO: 3.51 K/UL (ref 1–4.8)
LYMPHOCYTES NFR BLD: 26.9 % (ref 22–41)
MCH RBC QN AUTO: 28.3 PG (ref 27–33)
MCHC RBC AUTO-ENTMCNC: 32.5 G/DL (ref 32.2–35.5)
MCV RBC AUTO: 86.9 FL (ref 81.4–97.8)
MICRO URNS: ABNORMAL
MONOCYTES # BLD AUTO: 0.74 K/UL (ref 0–0.85)
MONOCYTES NFR BLD AUTO: 5.7 % (ref 0–13.4)
NEUTROPHILS # BLD AUTO: 8.48 K/UL (ref 1.82–7.42)
NEUTROPHILS NFR BLD: 65 % (ref 44–72)
NITRITE UR QL STRIP.AUTO: NEGATIVE
NRBC # BLD AUTO: 0 K/UL
NRBC BLD-RTO: 0 /100 WBC (ref 0–0.2)
PH UR STRIP.AUTO: 5.5 [PH] (ref 5–8)
PLATELET # BLD AUTO: 365 K/UL (ref 164–446)
PMV BLD AUTO: 9.8 FL (ref 9–12.9)
POTASSIUM SERPL-SCNC: 4.2 MMOL/L (ref 3.6–5.5)
PROT SERPL-MCNC: 7.3 G/DL (ref 6–8.2)
PROT UR QL STRIP: NEGATIVE MG/DL
RBC # BLD AUTO: 4.81 M/UL (ref 4.2–5.4)
RBC # URNS HPF: ABNORMAL /HPF
RBC UR QL AUTO: NEGATIVE
SODIUM SERPL-SCNC: 143 MMOL/L (ref 135–145)
SP GR UR STRIP.AUTO: 1.03
TROPONIN T SERPL-MCNC: <6 NG/L (ref 6–19)
TSH SERPL DL<=0.005 MIU/L-ACNC: 2.42 UIU/ML (ref 0.38–5.33)
UROBILINOGEN UR STRIP.AUTO-MCNC: 0.2 MG/DL
WBC # BLD AUTO: 13 K/UL (ref 4.8–10.8)
WBC #/AREA URNS HPF: ABNORMAL /HPF

## 2024-04-26 PROCEDURE — 84484 ASSAY OF TROPONIN QUANT: CPT

## 2024-04-26 PROCEDURE — 99283 EMERGENCY DEPT VISIT LOW MDM: CPT

## 2024-04-26 PROCEDURE — 81001 URINALYSIS AUTO W/SCOPE: CPT

## 2024-04-26 PROCEDURE — 71045 X-RAY EXAM CHEST 1 VIEW: CPT

## 2024-04-26 PROCEDURE — 83690 ASSAY OF LIPASE: CPT

## 2024-04-26 PROCEDURE — 36415 COLL VENOUS BLD VENIPUNCTURE: CPT

## 2024-04-26 PROCEDURE — 93005 ELECTROCARDIOGRAM TRACING: CPT

## 2024-04-26 PROCEDURE — 93005 ELECTROCARDIOGRAM TRACING: CPT | Performed by: EMERGENCY MEDICINE

## 2024-04-26 PROCEDURE — 80053 COMPREHEN METABOLIC PANEL: CPT

## 2024-04-26 PROCEDURE — 84443 ASSAY THYROID STIM HORMONE: CPT

## 2024-04-26 PROCEDURE — 84703 CHORIONIC GONADOTROPIN ASSAY: CPT

## 2024-04-26 PROCEDURE — 85379 FIBRIN DEGRADATION QUANT: CPT

## 2024-04-26 PROCEDURE — 85025 COMPLETE CBC W/AUTO DIFF WBC: CPT

## 2024-04-26 ASSESSMENT — PAIN DESCRIPTION - PAIN TYPE: TYPE: ACUTE PAIN

## 2024-04-26 ASSESSMENT — FIBROSIS 4 INDEX: FIB4 SCORE: 0.49

## 2024-04-27 NOTE — ED TRIAGE NOTES
"Chief Complaint   Patient presents with    Chest Pain     Started last Sunday with dizziness, states feeling \"fullness\" on the chest and achy\".    Palpitations     Started weeks ago; was seen at  and was told she has arrythmia    Other     BP is \"Swing BP\" goes up and down, and states her Oxygen is dropping to low 80's sometimes       Pt to triage ambulatory for above complaint. AOX4 GCS15. Protocol ordered    Pt back to lobby, educated on triage process and encourage to alert staff of any changes.     Vitals:    04/26/24 2021   BP: (!) 145/97   Pulse: 93   Resp: 17   Temp: 35.9 °C (96.6 °F)   SpO2: 98%          "

## 2024-04-27 NOTE — ED PROVIDER NOTES
"ED Provider Note    CHIEF COMPLAINT  Chief Complaint   Patient presents with    Chest Pain     Started last Sunday with dizziness, states feeling \"fullness\" on the chest and achy\".    Palpitations     Started weeks ago; was seen at  and was told she has arrythmia    Other     BP is \"Swing BP\" goes up and down, and states her Oxygen is dropping to low 80's sometimes       EXTERNAL RECORDS REVIEWED  Outpatient Notes Urgent care note 4/21/24 when the patient was evaluated for palpitations.    HPI/ROS  LIMITATION TO HISTORY   Select: : None  OUTSIDE HISTORIAN(S):  None    Zhanna Edmondson is a 23 y.o. female who presents to the emergency department for evaluation of chest pain and palpitations.  The patient states that over the last couple of weeks she has been having intermittent lightheadedness and feeling like her heart is racing.  She does have a pulse oximeter and states that she has noticed that her heart rate has gone up into the 160s.  She states that she took her pulse and that this was consistent.  She also admits to substernal pressure-like pain that is intermittent.  She states that laying down makes the pain better.  She denies any exacerbating factors.  She has not had any nausea, vomiting, or abdominal pain.  She does not smoke.  She has no personal history of diabetes, hypertension, hyperlipidemia or coronary artery disease.  She does not know if anyone in her family has had a heart attack before age 50.  She does not take OCPs or any other exactness hormones.  She has no recent travel history, hospitalizations or surgeries.  She has no previous history of DVT or PE.    PAST MEDICAL HISTORY   has a past medical history of Anxiety and Depression.    SURGICAL HISTORY   has a past surgical history that includes antoinette by laparoscopy (N/A, 1/14/2023).    FAMILY HISTORY  Family History   Problem Relation Age of Onset    Lung Disease Maternal Grandfather     Cancer Maternal Grandfather     Diabetes Paternal " "Grandfather     Hypertension Father        SOCIAL HISTORY  Social History     Tobacco Use    Smoking status: Never    Smokeless tobacco: Never   Vaping Use    Vaping Use: Never used   Substance and Sexual Activity    Alcohol use: Yes     Comment: socially.     Drug use: Never    Sexual activity: Not Currently     CURRENT MEDICATIONS  Home Medications       Reviewed by Lucia Hernandez R.N. (Registered Nurse) on 04/26/24 at 2037  Med List Status: Not Addressed     Medication Last Dose Status   Cholecalciferol (D3 VITAMIN PO)  Active   escitalopram (LEXAPRO) 20 MG tablet  Active   famotidine (PEPCID) 20 MG Tab  Active   phentermine (ADIPEX-P) 37.5 MG tablet  Active   sucralfate (CARAFATE) 1 GM Tab  Active                  ALLERGIES  Allergies   Allergen Reactions    Skin Adhesives Rash     Tape and bandaids  Tegaderm, ok     PHYSICAL EXAM  VITAL SIGNS: /85   Pulse 72   Temp 35.9 °C (96.6 °F) (Temporal)   Resp 15   Ht 1.676 m (5' 6\")   Wt 96.3 kg (212 lb 4.9 oz)   LMP 03/09/2024 (Approximate)   SpO2 96%   BMI 34.27 kg/m²   Constitutional: Alert and in no apparent distress.  HENT: Normocephalic atraumatic. Bilateral external ears normal. Nose normal. Mucous membranes are moist.  Eyes: Pupils are equal and reactive. Conjunctiva normal. Non-icteric sclera.   Neck: Normal range of motion without tenderness. Supple. No meningeal signs.  Cardiovascular: Regular rate and rhythm. No murmurs, gallops or rubs.  Thorax & Lungs: No increased work of breathing. Breath sounds are clear to auscultation bilaterally. No wheezing, rhonchi or rales.  Abdomen: Soft, nontender and nondistended. No hepatosplenomegaly.  Skin: Warm and dry. No rashes are noted.  Back: No bony tenderness, No CVA tenderness.   Extremities: 2+ peripheral pulses. Cap refill is less than 2 seconds. No edema, cyanosis, or clubbing.  Musculoskeletal: Good range of motion in all major joints. No tenderness to palpation or major deformities noted. "   Neurologic: Alert and oriented x 3. The patient moves all 4 extremities without obvious deficits.    EKG/LABS  Results for orders placed or performed during the hospital encounter of 04/26/24   CBC WITH DIFFERENTIAL   Result Value Ref Range    WBC 13.0 (H) 4.8 - 10.8 K/uL    RBC 4.81 4.20 - 5.40 M/uL    Hemoglobin 13.6 12.0 - 16.0 g/dL    Hematocrit 41.8 37.0 - 47.0 %    MCV 86.9 81.4 - 97.8 fL    MCH 28.3 27.0 - 33.0 pg    MCHC 32.5 32.2 - 35.5 g/dL    RDW 43.4 35.9 - 50.0 fL    Platelet Count 365 164 - 446 K/uL    MPV 9.8 9.0 - 12.9 fL    Neutrophils-Polys 65.00 44.00 - 72.00 %    Lymphocytes 26.90 22.00 - 41.00 %    Monocytes 5.70 0.00 - 13.40 %    Eosinophils 1.60 0.00 - 6.90 %    Basophils 0.50 0.00 - 1.80 %    Immature Granulocytes 0.30 0.00 - 0.90 %    Nucleated RBC 0.00 0.00 - 0.20 /100 WBC    Neutrophils (Absolute) 8.48 (H) 1.82 - 7.42 K/uL    Lymphs (Absolute) 3.51 1.00 - 4.80 K/uL    Monos (Absolute) 0.74 0.00 - 0.85 K/uL    Eos (Absolute) 0.21 0.00 - 0.51 K/uL    Baso (Absolute) 0.06 0.00 - 0.12 K/uL    Immature Granulocytes (abs) 0.04 0.00 - 0.11 K/uL    NRBC (Absolute) 0.00 K/uL   CMP   Result Value Ref Range    Sodium 143 135 - 145 mmol/L    Potassium 4.2 3.6 - 5.5 mmol/L    Chloride 108 96 - 112 mmol/L    Co2 21 20 - 33 mmol/L    Anion Gap 14.0 7.0 - 16.0    Glucose 86 65 - 99 mg/dL    Bun 11 8 - 22 mg/dL    Creatinine 0.65 0.50 - 1.40 mg/dL    Calcium 9.0 8.5 - 10.5 mg/dL    Correct Calcium 8.7 8.5 - 10.5 mg/dL    AST(SGOT) 22 12 - 45 U/L    ALT(SGPT) 15 2 - 50 U/L    Alkaline Phosphatase 98 30 - 99 U/L    Total Bilirubin 0.2 0.1 - 1.5 mg/dL    Albumin 4.4 3.2 - 4.9 g/dL    Total Protein 7.3 6.0 - 8.2 g/dL    Globulin 2.9 1.9 - 3.5 g/dL    A-G Ratio 1.5 g/dL   URINALYSIS    Specimen: Urine   Result Value Ref Range    Color Yellow     Character Cloudy (A)     Specific Gravity 1.029 <1.035    Ph 5.5 5.0 - 8.0    Glucose Negative Negative mg/dL    Ketones Trace (A) Negative mg/dL    Protein Negative  Negative mg/dL    Bilirubin Negative Negative    Urobilinogen, Urine 0.2 Negative    Nitrite Negative Negative    Leukocyte Esterase Small (A) Negative    Occult Blood Negative Negative    Micro Urine Req Microscopic    D-DIMER   Result Value Ref Range    D-Dimer 0.47 0.00 - 0.50 ug/mL (FEU)   HCG QUAL SERUM   Result Value Ref Range    Beta-Hcg Qualitative Serum Negative Negative   LIPASE   Result Value Ref Range    Lipase 27 11 - 82 U/L   TROPONIN   Result Value Ref Range    Troponin T <6 6 - 19 ng/L   TSH WITH REFLEX TO FT4   Result Value Ref Range    TSH 2.420 0.380 - 5.330 uIU/mL   URINE MICROSCOPIC (W/UA)   Result Value Ref Range    WBC 5-10 (A) /hpf    RBC 0-2 /hpf    Bacteria Moderate (A) None /hpf    Epithelial Cells Many (A) /hpf    Hyaline Cast 0-2 /lpf   ESTIMATED GFR   Result Value Ref Range    GFR (CKD-EPI) 126 >60 mL/min/1.73 m 2   EKG   Result Value Ref Range    Report       [facility] Emergency Dept.    Test Date:  2024  Pt Name:    [facility] [place]       Department: ER  MRN:        [id number]                      Room:  Gender:     Female                       Technician: [id number]  :        2000                   Requested By:ER TRIAGE PROTOCOL  Order #:    [id number]                    Reading MD: Bing Thomas    Measurements  Intervals                                Axis  Rate:       86                           P:          31  MI:         145                          QRS:        71  QRSD:       85                           T:          27  QT:         360  QTc:        431    Interpretive Statements  Sinus rhythm  Compared to ECG 2021 15:37:13  No significant changes  Electronically Signed On 2024 20:57:42 PDT by Bing Thomas       I have independently interpreted this EKG    RADIOLOGY/PROCEDURES   I have independently interpreted the diagnostic imaging associated with this visit and am waiting the final reading from the radiologist.   My preliminary  interpretation is as follows: Lung fields are clear bilaterally.    Radiologist interpretation:  DX-CHEST-PORTABLE (1 VIEW)   Final Result         1.  No acute cardiopulmonary disease.        COURSE & MEDICAL DECISION MAKING    ASSESSMENT, COURSE AND PLAN  Care Narrative: This is a 23-year-old female presenting to the emergency department for the evaluation of chest pain and palpitations.  On initial evaluation, the patient did not appear to be in any acute distress.  Her vital signs were reassuring.  Physical exam was reassuring with no evidence of respiratory distress.  Her exam was otherwise normal.    EKG was performed and showed a normal sinus rhythm with no evidence of acute ischemia or arrhythmia.  No evidence of WPW or Brugada was noted.  Her troponin was performed and normal.  Her heart score is 1.  I have extremely low clinical suspicion for ACS at this point.    Chest x-ray did not show any evidence of widened mediastinum.  The cardiac silhouette was within normal limits and there is no evidence of pulmonary edema or pleural effusions.  No focal opacity concerning for bacterial pneumonia was noted.  No pneumothorax was noted.    D-dimer was performed and negative.  I am less concerned for pulmonary embolism.    TSH was also normal and I am less concerned for hyperthyroidism or thyroid storm.    H&H were normal and she had no evidence of symptomatic anemia.  Electrolytes were without derangement.  LFTs and lipase were normal and less concern for hepatobiliary disease or acute pancreatitis.    Urinalysis did have 5-10 WBCs and moderate bacteria but did have many epithelial cells as well and I suspect it is more likely contaminated than a true UTI.    The patient was monitored on the cardiac monitors and had no evidence of arrhythmia.  Vital signs remained normal while in the ED.  Upon reassessment she is resting comfortably.  I do think she is stable for discharge at this time.  I encouraged her to  follow-up with her primary care physician and to return to the emergency department with any worsening signs or symptoms.    The patient has atypical chest pain as the patient's chest pain is not suggestive of pulmonary embolus, cardiac ischemia, aortic dissection, or other serious etiology. Given the extremely low risk of these diagnoses further testing and evaluation for these possibilities does not appear to be indicated at this time. The patient has been instructed to return if the symptoms worsen or change in any way.    ADDITIONAL PROBLEMS MANAGED  Chest pain, palpitations    DISPOSITION AND DISCUSSIONS  I have discussed management of the patient with the following physicians and JERAMY's: None    Discussion of management with other QHP or appropriate source(s): None     Escalation of care considered, and ultimately not performed:acute inpatient care management, however at this time, the patient is most appropriate for outpatient management    Barriers to care at this time, including but not limited to:  None .     Decision tools and prescription drugs considered including, but not limited to:  None .    FINAL IMPRESSION  1. Chest pain, unspecified type    2. Palpitations      PRESCRIPTIONS  New Prescriptions    No medications on file     FOLLOW UP  [facility], Emergency Dept  [address] [place] [address] [id number] [phone]  Go to   As needed    -DISCHARGE-    Electronically signed by: Bing Thomas D.O., 4/26/2024 8:53 PM

## 2024-05-06 ENCOUNTER — OFFICE VISIT (OUTPATIENT)
Dept: CARDIOLOGY | Facility: MEDICAL CENTER | Age: 24
End: 2024-05-06
Attending: PHYSICIAN ASSISTANT
Payer: COMMERCIAL

## 2024-05-06 VITALS
OXYGEN SATURATION: 98 % | HEIGHT: 66 IN | SYSTOLIC BLOOD PRESSURE: 120 MMHG | RESPIRATION RATE: 14 BRPM | WEIGHT: 215.2 LBS | BODY MASS INDEX: 34.58 KG/M2 | DIASTOLIC BLOOD PRESSURE: 88 MMHG | HEART RATE: 114 BPM

## 2024-05-06 DIAGNOSIS — G47.19 EXCESSIVE DAYTIME SLEEPINESS: ICD-10-CM

## 2024-05-06 DIAGNOSIS — R01.1 UNDIAGNOSED CARDIAC MURMURS: ICD-10-CM

## 2024-05-06 DIAGNOSIS — R07.89 OTHER CHEST PAIN: ICD-10-CM

## 2024-05-06 DIAGNOSIS — R53.83 FATIGUE, UNSPECIFIED TYPE: ICD-10-CM

## 2024-05-06 DIAGNOSIS — R00.2 PALPITATIONS: ICD-10-CM

## 2024-05-06 LAB — EKG IMPRESSION: NORMAL

## 2024-05-06 PROCEDURE — 3074F SYST BP LT 130 MM HG: CPT | Performed by: INTERNAL MEDICINE

## 2024-05-06 PROCEDURE — G2211 COMPLEX E/M VISIT ADD ON: HCPCS | Performed by: INTERNAL MEDICINE

## 2024-05-06 PROCEDURE — 93010 ELECTROCARDIOGRAM REPORT: CPT | Performed by: INTERNAL MEDICINE

## 2024-05-06 PROCEDURE — 3079F DIAST BP 80-89 MM HG: CPT | Performed by: INTERNAL MEDICINE

## 2024-05-06 PROCEDURE — 99204 OFFICE O/P NEW MOD 45 MIN: CPT | Performed by: INTERNAL MEDICINE

## 2024-05-06 ASSESSMENT — FIBROSIS 4 INDEX: FIB4 SCORE: 0.37

## 2024-05-06 ASSESSMENT — ENCOUNTER SYMPTOMS
PND: 0
ABDOMINAL PAIN: 0
COUGH: 0
WEIGHT LOSS: 0
BLOOD IN STOOL: 0
DOUBLE VISION: 0
SHORTNESS OF BREATH: 0
SPEECH CHANGE: 0
LOSS OF CONSCIOUSNESS: 0
ORTHOPNEA: 0
DEPRESSION: 0
DIZZINESS: 0
CHILLS: 0
SENSORY CHANGE: 0
EYE DISCHARGE: 0
EYE PAIN: 0
CLAUDICATION: 0
FALLS: 0
FEVER: 0
VOMITING: 0
BRUISES/BLEEDS EASILY: 0
BLURRED VISION: 0
HALLUCINATIONS: 0
NAUSEA: 0
MYALGIAS: 0
PALPITATIONS: 1
HEADACHES: 0

## 2024-05-06 NOTE — PROGRESS NOTES
Chief Complaint   Patient presents with    Palpitations       Subjective     Vinny Edmondson is a 23 y.o. female who presents today for cardiac care and evaluation for chest pain. Patient has chest pain at sporadic times. No specific precipitating factors or worsening factors. Chest pain is described to be pressure-like sensation however localized at anterior chest without radition. Lasting for seconds to minutes. No prior cardiac problems. No prior cardiac workup. + palpitations.    I have personally interpreted EKG today with patient, there is no evidence of acute coronary syndrome, no evidence of prior infarct, normal FL and QT interval, no significant conduction disease. Sinus rhythm.    No family history of sudden cardiac death.      Past Medical History:   Diagnosis Date    Anxiety     Depression      Past Surgical History:   Procedure Laterality Date    ZEKE BY LAPAROSCOPY N/A 1/14/2023    Procedure: CHOLECYSTECTOMY, LAPAROSCOPIC;  Surgeon: Inocencia Carroll M.D.;  Location: SURGERY [facility];  Service: General     Family History   Problem Relation Age of Onset    Lung Disease Maternal Grandfather     Cancer Maternal Grandfather     Diabetes Paternal Grandfather     Hypertension Father      Social History     Socioeconomic History    Marital status: Single     Spouse name: Not on file    Number of children: Not on file    Years of education: Not on file    Highest education level: Not on file   Occupational History    Occupation:    Tobacco Use    Smoking status: Never    Smokeless tobacco: Never   Vaping Use    Vaping Use: Never used   Substance and Sexual Activity    Alcohol use: Yes     Comment: socially.     Drug use: Never    Sexual activity: Not Currently   Other Topics Concern    Behavioral problems Not Asked    Interpersonal relationships Not Asked    Sad or not enjoying activities Not Asked    Suicidal thoughts Not Asked    Poor school performance Not Asked    Reading  difficulties Not Asked    Speech difficulties Not Asked    Writing difficulties Not Asked    Inadequate sleep Not Asked    Excessive TV viewing Not Asked    Excessive video game use Not Asked    Inadequate exercise Not Asked    Sports related Not Asked    Poor diet Not Asked    Family concerns for drug/alcohol abuse Not Asked    Poor oral hygiene Not Asked    Bike safety Not Asked    Family concerns vehicle safety Not Asked   Social History Narrative    Not on file     Social Determinants of Health     Financial Resource Strain: Not on file   Food Insecurity: Not on file   Transportation Needs: Not on file   Physical Activity: Not on file   Stress: Not on file   Social Connections: Not on file   Intimate Partner Violence: Not on file   Housing Stability: Not on file     Allergies   Allergen Reactions    Skin Adhesives Rash     Tape and bandaids  Tegaderm, ok     Outpatient Encounter Medications as of 5/6/2024   Medication Sig Dispense Refill    escitalopram (LEXAPRO) 20 MG tablet Take 20 mg by mouth every day.      Cholecalciferol (D3 VITAMIN PO) Take  by mouth. Pt taking 1500 units daily      [DISCONTINUED] phentermine (ADIPEX-P) 37.5 MG tablet Take 37.5 mg by mouth every morning. (Patient not taking: Reported on 4/21/2024)      [DISCONTINUED] sucralfate (CARAFATE) 1 GM Tab Take 1 Tablet by mouth 4 Times a Day,Before Meals and at Bedtime. (Patient not taking: Reported on 4/21/2024) 120 Tablet 3    [DISCONTINUED] famotidine (PEPCID) 20 MG Tab Take 1 Tablet by mouth 2 times a day. (Patient not taking: Reported on 4/21/2024) 60 Tablet 0     No facility-administered encounter medications on file as of 5/6/2024.     Review of Systems   Constitutional:  Negative for chills, fever, malaise/fatigue and weight loss.   HENT:  Negative for ear discharge, ear pain, hearing loss and nosebleeds.    Eyes:  Negative for blurred vision, double vision, pain and discharge.   Respiratory:  Negative for cough and shortness of breath.   "  Cardiovascular:  Positive for chest pain and palpitations. Negative for orthopnea, claudication, leg swelling and PND.   Gastrointestinal:  Negative for abdominal pain, blood in stool, melena, nausea and vomiting.   Genitourinary:  Negative for dysuria and hematuria.   Musculoskeletal:  Negative for falls, joint pain and myalgias.   Skin:  Negative for itching and rash.   Neurological:  Negative for dizziness, sensory change, speech change, loss of consciousness and headaches.   Endo/Heme/Allergies:  Negative for environmental allergies. Does not bruise/bleed easily.   Psychiatric/Behavioral:  Negative for depression, hallucinations and suicidal ideas.               Objective     /88 (BP Location: Left arm, Patient Position: Sitting, BP Cuff Size: Adult)   Pulse (!) 114   Resp 14   Ht 1.676 m (5' 6\")   Wt 97.6 kg (215 lb 3.2 oz)   LMP 03/09/2024 (Approximate)   SpO2 98%   BMI 34.73 kg/m²     Physical Exam  Vitals and nursing note reviewed.   Constitutional:       General: She is not in acute distress.     Appearance: She is not diaphoretic.   HENT:      Head: Normocephalic and atraumatic.      Right Ear: External ear normal.      Left Ear: External ear normal.      Nose: No congestion or rhinorrhea.   Eyes:      General:         Right eye: No discharge.         Left eye: No discharge.   Neck:      Thyroid: No thyromegaly.      Vascular: No JVD.   Cardiovascular:      Rate and Rhythm: Normal rate and regular rhythm.      Pulses: Normal pulses.      Heart sounds: Murmur heard.   Pulmonary:      Effort: No respiratory distress.   Abdominal:      General: There is no distension.      Tenderness: There is no abdominal tenderness.   Musculoskeletal:         General: No swelling or tenderness.      Right lower leg: No edema.      Left lower leg: No edema.   Skin:     General: Skin is warm and dry.   Neurological:      Mental Status: She is alert and oriented to person, place, and time.      Cranial Nerves: " No cranial nerve deficit.   Psychiatric:         Behavior: Behavior normal.                Assessment & Plan     1. Other chest pain  EKG    EC-ECHOCARDIOGRAM COMPLETE W/O CONT    NM-CARDIAC TREADMILL ONLY-NO IMAGING    Cardiac Event Monitor      2. Palpitations  EC-ECHOCARDIOGRAM COMPLETE W/O CONT    NM-CARDIAC TREADMILL ONLY-NO IMAGING    Cardiac Event Monitor      3. Undiagnosed cardiac murmurs  EC-ECHOCARDIOGRAM COMPLETE W/O CONT      4. Excessive daytime sleepiness  REFERRAL TO SLEEP STUDIES      5. Fatigue, unspecified type  REFERRAL TO SLEEP STUDIES          Medical Decision Making: Today's Assessment/Status/Plan:   At this time, to further risk stratify, I will order a transthoracic echocardiogram to assess cardiac and valvular functions. I will also order a treadmill stress test (to avoid radiation exposure in young patients) to assess for coronary ischemia.  I will also obtain home long-term event monitoring with zio patch.  I will refer patient to have sleep studies for obstructive sleep apnea.      This visit encounter signifies the visit complexity inherent to evaluation and management associated with medical care services that serve as the continuing focal point for all needed health care services and/or with medical care services that are part of ongoing care related to this patient's single, serious condition, complex cardiac condition.    Brant Dickson M.D.                        0 = understands/communicates without difficulty

## 2024-05-14 ENCOUNTER — NON-PROVIDER VISIT (OUTPATIENT)
Dept: CARDIOLOGY | Facility: MEDICAL CENTER | Age: 24
End: 2024-05-14
Attending: INTERNAL MEDICINE
Payer: COMMERCIAL

## 2024-05-14 DIAGNOSIS — R00.2 PALPITATIONS: ICD-10-CM

## 2024-05-14 DIAGNOSIS — R07.89 OTHER CHEST PAIN: ICD-10-CM

## 2024-05-14 NOTE — PROGRESS NOTES
Patient enrolled in the 14 day Zio Holter monitor per Brant Dickson MD. In clinic hook up, monitor s/n [id number]. Pending EOS.

## 2024-05-16 ENCOUNTER — OFFICE VISIT (OUTPATIENT)
Dept: URGENT CARE | Facility: CLINIC | Age: 24
End: 2024-05-16
Payer: COMMERCIAL

## 2024-05-16 ENCOUNTER — HOSPITAL ENCOUNTER (OUTPATIENT)
Facility: MEDICAL CENTER | Age: 24
End: 2024-05-16
Attending: PHYSICIAN ASSISTANT
Payer: COMMERCIAL

## 2024-05-16 VITALS
WEIGHT: 215 LBS | HEART RATE: 93 BPM | BODY MASS INDEX: 34.55 KG/M2 | SYSTOLIC BLOOD PRESSURE: 108 MMHG | TEMPERATURE: 98.3 F | DIASTOLIC BLOOD PRESSURE: 72 MMHG | OXYGEN SATURATION: 99 % | HEIGHT: 66 IN | RESPIRATION RATE: 15 BRPM

## 2024-05-16 DIAGNOSIS — Z20.2 STD EXPOSURE: ICD-10-CM

## 2024-05-16 PROCEDURE — 3074F SYST BP LT 130 MM HG: CPT | Performed by: PHYSICIAN ASSISTANT

## 2024-05-16 PROCEDURE — 99213 OFFICE O/P EST LOW 20 MIN: CPT | Performed by: PHYSICIAN ASSISTANT

## 2024-05-16 PROCEDURE — 3078F DIAST BP <80 MM HG: CPT | Performed by: PHYSICIAN ASSISTANT

## 2024-05-16 RX ORDER — METRONIDAZOLE 500 MG/1
500 TABLET ORAL 2 TIMES DAILY
Qty: 14 TABLET | Refills: 0 | Status: SHIPPED | OUTPATIENT
Start: 2024-05-16 | End: 2024-05-23

## 2024-05-16 RX ORDER — VALACYCLOVIR HYDROCHLORIDE 1 G/1
1000 TABLET, FILM COATED ORAL 2 TIMES DAILY
Qty: 20 TABLET | Refills: 0 | Status: SHIPPED | OUTPATIENT
Start: 2024-05-16

## 2024-05-16 RX ORDER — DOXYCYCLINE HYCLATE 100 MG
100 TABLET ORAL 2 TIMES DAILY
Qty: 14 TABLET | Refills: 0 | Status: SHIPPED | OUTPATIENT
Start: 2024-05-16 | End: 2024-05-23

## 2024-05-16 ASSESSMENT — FIBROSIS 4 INDEX: FIB4 SCORE: 0.37

## 2024-05-17 LAB
C TRACH DNA GENITAL QL NAA+PROBE: NEGATIVE
CANDIDA DNA VAG QL PROBE+SIG AMP: NEGATIVE
G VAGINALIS DNA VAG QL PROBE+SIG AMP: POSITIVE
N GONORRHOEA DNA GENITAL QL NAA+PROBE: NEGATIVE
SPECIMEN SOURCE: NORMAL
T VAGINALIS DNA VAG QL PROBE+SIG AMP: NEGATIVE

## 2024-05-18 ASSESSMENT — ENCOUNTER SYMPTOMS
FEVER: 0
SORE THROAT: 0
SWOLLEN GLANDS: 0

## 2024-05-18 NOTE — PROGRESS NOTES
"Subjective     Vinny Edmondson is a 23 y.o. female who presents with Exposure to STD (Exposed to herpes, had intercourse 2-3 months ago with person who had herpes)            Patient presents with:  Exposure to STD: Exposed to herpes, had intercourse 2-3 months ago with person who had herpes but he was unaware of it at the time.  Pt is currently asymptomatic but would like to be tested for STDs.  No other complaint.         Exposure to STD  This is a new problem. The current episode started more than 1 month ago. The problem has been unchanged. Pertinent negatives include no fever, rash, sore throat, swollen glands or urinary symptoms. Nothing aggravates the symptoms. She has tried nothing for the symptoms.       Review of Systems   Constitutional:  Negative for fever.   HENT:  Negative for sore throat.    Skin:  Negative for rash.   All other systems reviewed and are negative.             Objective     /72   Pulse 93   Temp 36.8 °C (98.3 °F) (Temporal)   Resp 15   Ht 1.676 m (5' 6\")   Wt 97.5 kg (215 lb)   LMP 03/09/2024 (Approximate)   SpO2 99%   BMI 34.70 kg/m²      Physical Exam  Vitals and nursing note reviewed.   Constitutional:       General: She is not in acute distress.     Appearance: Normal appearance. She is well-developed. She is not ill-appearing or toxic-appearing.   HENT:      Head: Normocephalic and atraumatic.      Right Ear: Tympanic membrane normal.      Left Ear: Tympanic membrane normal.      Nose: Nose normal.      Mouth/Throat:      Lips: Pink.      Mouth: Mucous membranes are moist.      Pharynx: Oropharynx is clear. Uvula midline.   Eyes:      Extraocular Movements: Extraocular movements intact.      Conjunctiva/sclera: Conjunctivae normal.      Pupils: Pupils are equal, round, and reactive to light.   Cardiovascular:      Rate and Rhythm: Normal rate and regular rhythm.      Pulses: Normal pulses.      Heart sounds: Normal heart sounds.   Pulmonary:      Effort: " Pulmonary effort is normal.      Breath sounds: Normal breath sounds.   Abdominal:      General: Bowel sounds are normal.      Palpations: Abdomen is soft.   Musculoskeletal:         General: Normal range of motion.      Cervical back: Normal range of motion and neck supple.   Skin:     General: Skin is warm and dry.      Capillary Refill: Capillary refill takes less than 2 seconds.   Neurological:      General: No focal deficit present.      Mental Status: She is alert and oriented to person, place, and time.      Cranial Nerves: No cranial nerve deficit.      Motor: Motor function is intact.      Coordination: Coordination is intact.      Gait: Gait normal.   Psychiatric:         Mood and Affect: Mood normal.                             Assessment & Plan        1. STD exposure     - POCT Urinalysis  - POCT Pregnancy  - VAGINAL PATHOGENS DNA PANEL; Future  - Chlamydia/GC, PCR (Genital/Anal swab); Future  - HIV AG/AB Combo Assay Screening; Future  - T.Pallidum AB ELKE (Screening); Future  - Hepatitis C Virus Antibody; Future  - doxycycline (VIBRAMYCIN) 100 MG Tab; Take 1 Tablet by mouth 2 times a day for 7 days.  Dispense: 14 Tablet; Refill: 0  - metroNIDAZOLE (FLAGYL) 500 MG Tab; Take 1 Tablet by mouth 2 times a day for 7 days.  Dispense: 14 Tablet; Refill: 0  - valacyclovir (VALTREX) 1 GM Tab; Take 1 Tablet by mouth 2 times a day.  Dispense: 20 Tablet; Refill: 0            PT here for STD screening, she is asymptomatic.      I have ordered STD panel and some contingent medications based on test results.  We have discussed at length, when and why to pick them up from the pharmacy.  If all results are negative, pt does not need any treatment.      Discussed STD exposure risks and transmission.  If pt is not using protection, they are engaging in high risk sexual behavior which exposes them to HIV, syphilis, hepatitis, herpes, gonorrhea, chlamydia and trichomoniasis.  PT was encouraged to always use protection to  reduce transmission of these STDs.     Pt instructed to refrain from any sexual activities or sexual contact with others until treatment is completed.       Differential diagnosis, supportive care, and indications for immediate follow-up discussed with patient.  Instructed to return to clinic or nearest emergency department for any change in condition, further concerns, or worsening of symptoms.    I personally reviewed prior external notes and test results pertinent to today's visit.  I have independently reviewed and interpreted all diagnostics ordered during this urgent care visit.    PT should follow up with PCP in 1-2 days for re-evaluation if symptoms have not improved.      Discussed red flags and reasons to return to UC or ED.      Pt and/or family verbalized understanding of diagnosis and follow up instructions and was offered informational handout on diagnosis.  PT discharged.     Please note that this dictation was created using voice recognition software. I have made every reasonable attempt to correct obvious errors, but I expect that there may be errors of grammar and possibly content that I did not discover before finalizing the note.

## 2024-06-03 ENCOUNTER — TELEPHONE (OUTPATIENT)
Dept: CARDIOLOGY | Facility: MEDICAL CENTER | Age: 24
End: 2024-06-03
Payer: COMMERCIAL

## 2024-06-03 NOTE — TELEPHONE ENCOUNTER
THERESA EOS to TT's nurse, Leslee, on 6/3/2024  Preliminary findings:  Sinus rhythm,  with an avg rate of 85bpm  146 patient events corresponding with the following:  SR   SVE(s)

## 2024-07-10 ENCOUNTER — HOSPITAL ENCOUNTER (OUTPATIENT)
Dept: CARDIOLOGY | Facility: MEDICAL CENTER | Age: 24
End: 2024-07-10
Attending: INTERNAL MEDICINE
Payer: COMMERCIAL

## 2024-07-10 DIAGNOSIS — R00.2 PALPITATIONS: ICD-10-CM

## 2024-07-10 DIAGNOSIS — R01.1 UNDIAGNOSED CARDIAC MURMURS: ICD-10-CM

## 2024-07-10 DIAGNOSIS — R07.89 OTHER CHEST PAIN: ICD-10-CM

## 2024-07-10 PROCEDURE — 93306 TTE W/DOPPLER COMPLETE: CPT

## 2024-07-15 LAB
LV EJECT FRACT  99904: 65
LV EJECT FRACT MOD 4C 99902: 63.07

## 2024-07-15 PROCEDURE — 93306 TTE W/DOPPLER COMPLETE: CPT | Mod: 26 | Performed by: INTERNAL MEDICINE

## 2024-07-16 ENCOUNTER — HOSPITAL ENCOUNTER (OUTPATIENT)
Dept: RADIOLOGY | Facility: MEDICAL CENTER | Age: 24
End: 2024-07-16
Attending: INTERNAL MEDICINE
Payer: COMMERCIAL

## 2024-07-16 DIAGNOSIS — R00.2 PALPITATIONS: ICD-10-CM

## 2024-07-16 DIAGNOSIS — R07.89 OTHER CHEST PAIN: ICD-10-CM

## 2024-07-16 PROCEDURE — 93017 CV STRESS TEST TRACING ONLY: CPT

## 2024-07-19 PROCEDURE — 93018 CV STRESS TEST I&R ONLY: CPT | Performed by: INTERNAL MEDICINE

## 2024-11-14 ENCOUNTER — OFFICE VISIT (OUTPATIENT)
Dept: CARDIOLOGY | Facility: MEDICAL CENTER | Age: 24
End: 2024-11-14
Attending: INTERNAL MEDICINE
Payer: COMMERCIAL

## 2024-11-14 VITALS
HEIGHT: 66 IN | SYSTOLIC BLOOD PRESSURE: 102 MMHG | DIASTOLIC BLOOD PRESSURE: 70 MMHG | WEIGHT: 216 LBS | RESPIRATION RATE: 16 BRPM | BODY MASS INDEX: 34.72 KG/M2 | OXYGEN SATURATION: 95 % | HEART RATE: 79 BPM

## 2024-11-14 DIAGNOSIS — R00.2 PALPITATIONS: ICD-10-CM

## 2024-11-14 DIAGNOSIS — I49.3 PVC'S (PREMATURE VENTRICULAR CONTRACTIONS): ICD-10-CM

## 2024-11-14 DIAGNOSIS — I49.1 APC (ATRIAL PREMATURE CONTRACTIONS): ICD-10-CM

## 2024-11-14 DIAGNOSIS — G47.33 OSA (OBSTRUCTIVE SLEEP APNEA): ICD-10-CM

## 2024-11-14 PROCEDURE — 99211 OFF/OP EST MAY X REQ PHY/QHP: CPT | Performed by: INTERNAL MEDICINE

## 2024-11-14 PROCEDURE — 99214 OFFICE O/P EST MOD 30 MIN: CPT | Performed by: INTERNAL MEDICINE

## 2024-11-14 PROCEDURE — 99213 OFFICE O/P EST LOW 20 MIN: CPT | Performed by: INTERNAL MEDICINE

## 2024-11-14 PROCEDURE — 3074F SYST BP LT 130 MM HG: CPT | Performed by: INTERNAL MEDICINE

## 2024-11-14 PROCEDURE — 3078F DIAST BP <80 MM HG: CPT | Performed by: INTERNAL MEDICINE

## 2024-11-14 RX ORDER — BUPROPION HYDROCHLORIDE 150 MG/1
TABLET ORAL
COMMUNITY
Start: 2024-11-13

## 2024-11-14 ASSESSMENT — ENCOUNTER SYMPTOMS
BLOOD IN STOOL: 0
VOMITING: 0
DEPRESSION: 0
EYE DISCHARGE: 0
BLURRED VISION: 0
LOSS OF CONSCIOUSNESS: 0
DOUBLE VISION: 0
BRUISES/BLEEDS EASILY: 0
NAUSEA: 0
FALLS: 0
CLAUDICATION: 0
HEADACHES: 0
SHORTNESS OF BREATH: 0
FEVER: 0
SPEECH CHANGE: 0
ABDOMINAL PAIN: 0
SENSORY CHANGE: 0
PND: 0
PALPITATIONS: 1
CHILLS: 0
HALLUCINATIONS: 0
COUGH: 0
MYALGIAS: 0
EYE PAIN: 0
DIZZINESS: 0
WEIGHT LOSS: 0
ORTHOPNEA: 0

## 2024-11-14 ASSESSMENT — FIBROSIS 4 INDEX: FIB4 SCORE: 0.39

## 2024-11-14 NOTE — PROGRESS NOTES
Chief Complaint   Patient presents with    Chest Pain     NP F/V DX: Other chest pain         Subjective     Vinny Edmondson is a 24 y.o. female who presents today for cardiac care and evaluation for chest pain. Patient has chest pain at sporadic times. No specific precipitating factors or worsening factors. Chest pain is described to be pressure-like sensation however localized at anterior chest without radition. Lasting for seconds to minutes. No prior cardiac problems. No prior cardiac workup. + palpitations.    I have personally interpreted EKG today with patient, there is no evidence of acute coronary syndrome, no evidence of prior infarct, normal MA and QT interval, no significant conduction disease. Sinus rhythm.    No family history of sudden cardiac death.    I have independently interpreted and reviewed blood tests results with patient in clinic which show GFR of 126,  K of 4.2.    I have independently interpreted and reviewed echocardiogram's actual images with patient which showed normal left ventricular systolic function. No wall motion abnormality. No evidence of pulmonary hypertension. No significant valvular disease.    I have independently interpreted and reviewed stress test's actual EKG tracing with patient which showed normal left ventricular systolic function. No coronary ischemia.    I personally interpreted and reviewed the tracings of event monitor with patient in clinic today which showed no significant events of sinus pause, no significant arrhythmias. no significant AV block, rare PACs and rare PVCs.      Past Medical History:   Diagnosis Date    Anxiety     Depression      Past Surgical History:   Procedure Laterality Date    ZEKE BY LAPAROSCOPY N/A 1/14/2023    Procedure: CHOLECYSTECTOMY, LAPAROSCOPIC;  Surgeon: Inocencia Carroll M.D.;  Location: SURGERY [facility];  Service: General     Family History   Problem Relation Age of Onset    Lung Disease Maternal Grandfather      Cancer Maternal Grandfather     Diabetes Paternal Grandfather     Hypertension Father      Social History     Socioeconomic History    Marital status: Single     Spouse name: Not on file    Number of children: Not on file    Years of education: Not on file    Highest education level: Not on file   Occupational History    Occupation:    Tobacco Use    Smoking status: Never    Smokeless tobacco: Never   Vaping Use    Vaping status: Never Used   Substance and Sexual Activity    Alcohol use: Yes     Comment: occ.    Drug use: Never    Sexual activity: Not Currently   Other Topics Concern    Behavioral problems Not Asked    Interpersonal relationships Not Asked    Sad or not enjoying activities Not Asked    Suicidal thoughts Not Asked    Poor school performance Not Asked    Reading difficulties Not Asked    Speech difficulties Not Asked    Writing difficulties Not Asked    Inadequate sleep Not Asked    Excessive TV viewing Not Asked    Excessive video game use Not Asked    Inadequate exercise Not Asked    Sports related Not Asked    Poor diet Not Asked    Family concerns for drug/alcohol abuse Not Asked    Poor oral hygiene Not Asked    Bike safety Not Asked    Family concerns vehicle safety Not Asked   Social History Narrative    Not on file     Social Drivers of Health     Financial Resource Strain: Not on file   Food Insecurity: Not on file   Transportation Needs: Not on file   Physical Activity: Not on file   Stress: Not on file   Social Connections: Not on file   Intimate Partner Violence: Not on file   Housing Stability: Not on file     Allergies   Allergen Reactions    Skin Adhesives Rash     Tape and bandaids  Tegaderm, ok     Outpatient Encounter Medications as of 11/14/2024   Medication Sig Dispense Refill    buPROPion (WELLBUTRIN XL) 150 MG XL tablet  (Patient taking differently: 300 mg. Patient reported taking 300 once a day.)      escitalopram (LEXAPRO) 20 MG tablet Take 20 mg by mouth every  "day.      Cholecalciferol (D3 VITAMIN PO) Take  by mouth. Pt taking 1500 units daily      [DISCONTINUED] valacyclovir (VALTREX) 1 GM Tab Take 1 Tablet by mouth 2 times a day. 20 Tablet 0     No facility-administered encounter medications on file as of 11/14/2024.     Review of Systems   Constitutional:  Negative for chills, fever, malaise/fatigue and weight loss.   HENT:  Negative for ear discharge, ear pain, hearing loss and nosebleeds.    Eyes:  Negative for blurred vision, double vision, pain and discharge.   Respiratory:  Negative for cough and shortness of breath.    Cardiovascular:  Positive for chest pain and palpitations. Negative for orthopnea, claudication, leg swelling and PND.   Gastrointestinal:  Negative for abdominal pain, blood in stool, melena, nausea and vomiting.   Genitourinary:  Negative for dysuria and hematuria.   Musculoskeletal:  Negative for falls, joint pain and myalgias.   Skin:  Negative for itching and rash.   Neurological:  Negative for dizziness, sensory change, speech change, loss of consciousness and headaches.   Endo/Heme/Allergies:  Negative for environmental allergies. Does not bruise/bleed easily.   Psychiatric/Behavioral:  Negative for depression, hallucinations and suicidal ideas.               Objective     /70 (BP Location: Left arm, Patient Position: Sitting, BP Cuff Size: Adult)   Pulse 79   Resp 16   Ht 1.676 m (5' 6\")   Wt 98 kg (216 lb)   SpO2 95%   BMI 34.86 kg/m²     Physical Exam  Vitals and nursing note reviewed.   Constitutional:       General: She is not in acute distress.     Appearance: She is not diaphoretic.   HENT:      Head: Normocephalic and atraumatic.      Right Ear: External ear normal.      Left Ear: External ear normal.      Nose: No congestion or rhinorrhea.   Eyes:      General:         Right eye: No discharge.         Left eye: No discharge.   Neck:      Thyroid: No thyromegaly.      Vascular: No JVD.   Cardiovascular:      Rate and " Rhythm: Normal rate and regular rhythm.      Pulses: Normal pulses.      Heart sounds: Murmur heard.   Pulmonary:      Effort: No respiratory distress.   Abdominal:      General: There is no distension.      Tenderness: There is no abdominal tenderness.   Musculoskeletal:         General: No swelling or tenderness.      Right lower leg: No edema.      Left lower leg: No edema.   Skin:     General: Skin is warm and dry.   Neurological:      Mental Status: She is alert and oriented to person, place, and time.      Cranial Nerves: No cranial nerve deficit.   Psychiatric:         Behavior: Behavior normal.                Assessment & Plan     1. PVC's (premature ventricular contractions)        2. APC (atrial premature contractions)        3. Palpitations        4. MINOR (obstructive sleep apnea)              Medical Decision Making: Today's Assessment/Status/Plan:   Palpitations are likely related to PACs and PVCs.  I Advised patient on reduction of caffeine, ETOH intake along with adequate electrolytes hydration.  Optimize exercise program.  Consider medical therapy in future if symptoms not improved. Trial of non-medical regimen first to avoid medical side effects.    Mild obstructive sleep apnea.    This visit encounter signifies the visit complexity inherent to evaluation and management associated with medical care services that serve as the continuing focal point for all needed health care services and/or with medical care services that are part of ongoing care related to this patient's single, serious condition, complex cardiac condition.    Brant Dickson M.D.

## 2024-11-19 ENCOUNTER — APPOINTMENT (RX ONLY)
Dept: URBAN - METROPOLITAN AREA CLINIC 15 | Facility: CLINIC | Age: 24
Setting detail: DERMATOLOGY
End: 2024-11-19

## 2024-11-19 DIAGNOSIS — L70.0 ACNE VULGARIS: ICD-10-CM

## 2024-11-19 DIAGNOSIS — D18.0 HEMANGIOMA: ICD-10-CM

## 2024-11-19 PROBLEM — D18.01 HEMANGIOMA OF SKIN AND SUBCUTANEOUS TISSUE: Status: ACTIVE | Noted: 2024-11-19

## 2024-11-19 PROCEDURE — ? PRESCRIPTION

## 2024-11-19 PROCEDURE — 99204 OFFICE O/P NEW MOD 45 MIN: CPT

## 2024-11-19 PROCEDURE — ? COUNSELING

## 2024-11-19 RX ORDER — TRETIONIN 0.25 MG/G
CREAM TOPICAL QHS
Qty: 45 | Refills: 2 | Status: ERX | COMMUNITY
Start: 2024-11-19

## 2024-11-19 RX ORDER — CLINDAMYCIN PHOSPHATE 10 MG/G
GEL TOPICAL
Qty: 60 | Refills: 5 | Status: ERX | COMMUNITY
Start: 2024-11-19

## 2024-11-19 RX ADMIN — TRETIONIN: 0.25 CREAM TOPICAL at 00:00

## 2024-11-19 RX ADMIN — CLINDAMYCIN PHOSPHATE: 10 GEL TOPICAL at 00:00

## 2024-11-19 ASSESSMENT — LOCATION DETAILED DESCRIPTION DERM: LOCATION DETAILED: RIGHT ANTERIOR DISTAL THIGH

## 2024-11-19 ASSESSMENT — SEVERITY ASSESSMENT OVERALL AMONG ALL PATIENTS
IN YOUR EXPERIENCE, AMONG ALL PATIENTS YOU HAVE SEEN WITH THIS CONDITION, HOW SEVERE IS THIS PATIENT'S CONDITION?: MULTIPLE INFLAMMATORY LESIONS BUT NONINFLAMMATORY LESIONS PREDOMINATE

## 2024-11-19 ASSESSMENT — LOCATION ZONE DERM: LOCATION ZONE: LEG

## 2024-11-19 ASSESSMENT — LOCATION SIMPLE DESCRIPTION DERM: LOCATION SIMPLE: RIGHT THIGH

## 2024-11-19 NOTE — HPI: ACNE (PATIENT REPORTED)
Where Is Your Acne Located?: Face, neck, back, and shoulders
List Over The Counter Products You Tried (Separate Each Name With A Comma):: Face washes, cerave
Please List The Treatments That Have Worked Best For Your Acne: (Separate Each Entry With A Comma): Sisters Clindamycin used and worked.
Additional Comments (Use Complete Sentences): Had this since 11, got worse with COVID and masks.

## 2024-11-19 NOTE — PROCEDURE: COUNSELING
Aklief Pregnancy And Lactation Text: It is unknown if this medication is safe to use during pregnancy.  It is unknown if this medication is excreted in breast milk.  Breastfeeding women should use the topical cream on the smallest area of the skin for the shortest time needed while breastfeeding.  Do not apply to nipple and areola.
Include Pregnancy/Lactation Warning?: No
Dapsone Pregnancy And Lactation Text: This medication is Pregnancy Category C and is not considered safe during pregnancy or breast feeding.
Topical Clindamycin Counseling: Patient counseled that this medication may cause skin irritation or allergic reactions.  In the event of skin irritation, the patient was advised to reduce the amount of the drug applied or use it less frequently.   The patient verbalized understanding of the proper use and possible adverse effects of clindamycin.  All of the patient's questions and concerns were addressed.
Detail Level: Detailed
Minocycline Counseling: Patient advised regarding possible photosensitivity and discoloration of the teeth, skin, lips, tongue and gums.  Patient instructed to avoid sunlight, if possible.  When exposed to sunlight, patients should wear protective clothing, sunglasses, and sunscreen.  The patient was instructed to call the office immediately if the following severe adverse effects occur:  hearing changes, easy bruising/bleeding, severe headache, or vision changes.  The patient verbalized understanding of the proper use and possible adverse effects of minocycline.  All of the patient's questions and concerns were addressed.
Bactrim Pregnancy And Lactation Text: This medication is Pregnancy Category D and is known to cause fetal risk.  It is also excreted in breast milk.
Tazorac Counseling:  Patient advised that medication is irritating and drying.  Patient may need to apply sparingly and wash off after an hour before eventually leaving it on overnight.  The patient verbalized understanding of the proper use and possible adverse effects of tazorac.  All of the patient's questions and concerns were addressed.
Tetracycline Pregnancy And Lactation Text: This medication is Pregnancy Category D and not consider safe during pregnancy. It is also excreted in breast milk.
High Dose Vitamin A Counseling: Side effects reviewed, pt to contact office should one occur.
Birth Control Pills Pregnancy And Lactation Text: This medication should be avoided if pregnant and for the first 30 days post-partum.
Winlevi Pregnancy And Lactation Text: This medication is considered safe during pregnancy and breastfeeding.
Topical Retinoid counseling:  Patient advised to apply a pea-sized amount only at bedtime and wait 30 minutes after washing their face before applying.  If too drying, patient may add a non-comedogenic moisturizer. The patient verbalized understanding of the proper use and possible adverse effects of retinoids.  All of the patient's questions and concerns were addressed.
Spironolactone Pregnancy And Lactation Text: This medication can cause feminization of the male fetus and should be avoided during pregnancy. The active metabolite is also found in breast milk.
Isotretinoin Counseling: Patient should get monthly blood tests, not donate blood, not drive at night if vision affected, not share medication, and not undergo elective surgery for 6 months after tx completed. Side effects reviewed, pt to contact office should one occur.
Doxycycline Counseling:  Patient counseled regarding possible photosensitivity and increased risk for sunburn.  Patient instructed to avoid sunlight, if possible.  When exposed to sunlight, patients should wear protective clothing, sunglasses, and sunscreen.  The patient was instructed to call the office immediately if the following severe adverse effects occur:  hearing changes, easy bruising/bleeding, severe headache, or vision changes.  The patient verbalized understanding of the proper use and possible adverse effects of doxycycline.  All of the patient's questions and concerns were addressed.
Benzoyl Peroxide Counseling: Patient counseled that medicine may cause skin irritation and bleach clothing.  In the event of skin irritation, the patient was advised to reduce the amount of the drug applied or use it less frequently.   The patient verbalized understanding of the proper use and possible adverse effects of benzoyl peroxide.  All of the patient's questions and concerns were addressed.
Erythromycin Counseling:  I discussed with the patient the risks of erythromycin including but not limited to GI upset, allergic reaction, drug rash, diarrhea, increase in liver enzymes, and yeast infections.
Azithromycin Pregnancy And Lactation Text: This medication is considered safe during pregnancy and is also secreted in breast milk.
Topical Sulfur Applications Pregnancy And Lactation Text: This medication is Pregnancy Category C and has an unknown safety profile during pregnancy. It is unknown if this topical medication is excreted in breast milk.
Aklief counseling:  Patient advised to apply a pea-sized amount only at bedtime and wait 30 minutes after washing their face before applying.  If too drying, patient may add a non-comedogenic moisturizer.  The most commonly reported side effects including irritation, redness, scaling, dryness, stinging, burning, itching, and increased risk of sunburn.  The patient verbalized understanding of the proper use and possible adverse effects of retinoids.  All of the patient's questions and concerns were addressed.
High Dose Vitamin A Pregnancy And Lactation Text: High dose vitamin A therapy is contraindicated during pregnancy and breast feeding.
Dapsone Counseling: I discussed with the patient the risks of dapsone including but not limited to hemolytic anemia, agranulocytosis, rashes, methemoglobinemia, kidney failure, peripheral neuropathy, headaches, GI upset, and liver toxicity.  Patients who start dapsone require monitoring including baseline LFTs and weekly CBCs for the first month, then every month thereafter.  The patient verbalized understanding of the proper use and possible adverse effects of dapsone.  All of the patient's questions and concerns were addressed.
Topical Clindamycin Pregnancy And Lactation Text: This medication is Pregnancy Category B and is considered safe during pregnancy. It is unknown if it is excreted in breast milk.
Azelaic Acid Counseling: Patient counseled that medicine may cause skin irritation and to avoid applying near the eyes.  In the event of skin irritation, the patient was advised to reduce the amount of the drug applied or use it less frequently.   The patient verbalized understanding of the proper use and possible adverse effects of azelaic acid.  All of the patient's questions and concerns were addressed.
Birth Control Pills Counseling: Birth Control Pill Counseling: I discussed with the patient the potential side effects of OCPs including but not limited to increased risk of stroke, heart attack, thrombophlebitis, deep venous thrombosis, hepatic adenomas, breast changes, GI upset, headaches, and depression.  The patient verbalized understanding of the proper use and possible adverse effects of OCPs. All of the patient's questions and concerns were addressed.
Isotretinoin Pregnancy And Lactation Text: This medication is Pregnancy Category X and is considered extremely dangerous during pregnancy. It is unknown if it is excreted in breast milk.
Tazorac Pregnancy And Lactation Text: This medication is not safe during pregnancy. It is unknown if this medication is excreted in breast milk.
Spironolactone Counseling: Patient advised regarding risks of diarrhea, abdominal pain, hyperkalemia, birth defects (for female patients), liver toxicity and renal toxicity. The patient may need blood work to monitor liver and kidney function and potassium levels while on therapy. The patient verbalized understanding of the proper use and possible adverse effects of spironolactone.  All of the patient's questions and concerns were addressed.
Erythromycin Pregnancy And Lactation Text: This medication is Pregnancy Category B and is considered safe during pregnancy. It is also excreted in breast milk.
Bactrim Counseling:  I discussed with the patient the risks of sulfa antibiotics including but not limited to GI upset, allergic reaction, drug rash, diarrhea, dizziness, photosensitivity, and yeast infections.  Rarely, more serious reactions can occur including but not limited to aplastic anemia, agranulocytosis, methemoglobinemia, blood dyscrasias, liver or kidney failure, lung infiltrates or desquamative/blistering drug rashes.
Topical Retinoid Pregnancy And Lactation Text: This medication is Pregnancy Category C. It is unknown if this medication is excreted in breast milk.
Winlevi Counseling:  I discussed with the patient the risks of topical clascoterone including but not limited to erythema, scaling, itching, and stinging. Patient voiced their understanding.
Tetracycline Counseling: Patient counseled regarding possible photosensitivity and increased risk for sunburn.  Patient instructed to avoid sunlight, if possible.  When exposed to sunlight, patients should wear protective clothing, sunglasses, and sunscreen.  The patient was instructed to call the office immediately if the following severe adverse effects occur:  hearing changes, easy bruising/bleeding, severe headache, or vision changes.  The patient verbalized understanding of the proper use and possible adverse effects of tetracycline.  All of the patient's questions and concerns were addressed. Patient understands to avoid pregnancy while on therapy due to potential birth defects.
Topical Sulfur Applications Counseling: Topical Sulfur Counseling: Patient counseled that this medication may cause skin irritation or allergic reactions.  In the event of skin irritation, the patient was advised to reduce the amount of the drug applied or use it less frequently.   The patient verbalized understanding of the proper use and possible adverse effects of topical sulfur application.  All of the patient's questions and concerns were addressed.
Azelaic Acid Pregnancy And Lactation Text: This medication is considered safe during pregnancy and breast feeding.
Sarecycline Counseling: Patient advised regarding possible photosensitivity and discoloration of the teeth, skin, lips, tongue and gums.  Patient instructed to avoid sunlight, if possible.  When exposed to sunlight, patients should wear protective clothing, sunglasses, and sunscreen.  The patient was instructed to call the office immediately if the following severe adverse effects occur:  hearing changes, easy bruising/bleeding, severe headache, or vision changes.  The patient verbalized understanding of the proper use and possible adverse effects of sarecycline.  All of the patient's questions and concerns were addressed.
Doxycycline Pregnancy And Lactation Text: This medication is Pregnancy Category D and not consider safe during pregnancy. It is also excreted in breast milk but is considered safe for shorter treatment courses.
Azithromycin Counseling:  I discussed with the patient the risks of azithromycin including but not limited to GI upset, allergic reaction, drug rash, diarrhea, and yeast infections.
Benzoyl Peroxide Pregnancy And Lactation Text: This medication is Pregnancy Category C. It is unknown if benzoyl peroxide is excreted in breast milk.

## 2025-04-08 NOTE — PROGRESS NOTES
"Chief Complaint   Patient presents with    Premature Ventricular Contractions (PVCs)     F/V Dx: PVC's premature ventricular contractions          Subjective:   Zhanna Edmondson is a 24 y.o. female who presents today for follow-up.     Patient of Dr. Dickson.  Current medical problems include chest pain, palpitations. Their last clinic visit was 11/14/2024 with Dr. Dickson.    Today's visit:  Reports that since her last follow-up with Dr. Guy she is continue to have almost daily palpitations.  They range from 10 minutes to 30 minutes in duration.  However just last week she had an episode where she could feel her heart racing she was just sitting at the end of the day she was short of breath her blood pressure elevated and she is very symptomatic.  She did take 5 mg of propranolol during that episode which helped decrease her heart rate.  She does report dizziness and lightheadedness with these episodes.  She denies syncope, orthopnea, PND, edema, or syncope.  Reports having low-grade intermittent fevers since November and is having a workup seeing a specialist later today.    Cardiovascular Risk Factors:  1. Smoking status: Never  2. Type II Diabetes Mellitus: No No results found for: \"HBA1C\"  3. Hypertension: No  4. Dyslipidemia: No No results found for: \"CHOLSTRLTOT\", \"LDL\", \"HDL\", \"TRIGLYCERIDE\"    Past Medical History:   Diagnosis Date    Anxiety     Depression          Family History   Problem Relation Age of Onset    Lung Disease Maternal Grandfather     Cancer Maternal Grandfather     Diabetes Paternal Grandfather     Hypertension Father          Social History     Tobacco Use    Smoking status: Never    Smokeless tobacco: Never   Vaping Use    Vaping status: Never Used   Substance Use Topics    Alcohol use: Yes     Comment: occ.    Drug use: Never         Allergies   Allergen Reactions    Skin Adhesives Rash     Tape and bandaids  Tegaderm, ok         Current Outpatient Medications   Medication Sig    " "atomoxetine (STRATTERA) 40 MG capsule Take 40 mg by mouth every morning.    propranolol (INDERAL) 10 MG Tab Take 1 Tablet by mouth 1 time a day as needed (as needed for sympotmatic palpitations).    buPROPion (WELLBUTRIN XL) 150 MG XL tablet  (Patient taking differently: 300 mg. Patient reported taking 300 once a day.)    escitalopram (LEXAPRO) 20 MG tablet Take 20 mg by mouth every day.    Cholecalciferol (D3 VITAMIN PO) Take  by mouth. Pt taking 1500 units daily         Review of Systems   Constitutional: Negative for malaise/fatigue.   Cardiovascular:  Positive for palpitations. Negative for chest pain, dyspnea on exertion, leg swelling, near-syncope, orthopnea, paroxysmal nocturnal dyspnea and syncope.   Respiratory:  Positive for shortness of breath.    Neurological:  Positive for dizziness and light-headedness. Negative for headaches.           Objective:   /82 (BP Location: Left arm, Patient Position: Sitting, BP Cuff Size: Adult)   Pulse (!) 120   Resp 16   Ht 1.676 m (5' 6\")   Wt 100 kg (221 lb)   SpO2 97%  Body mass index is 35.67 kg/m².         Physical Exam  Vitals reviewed.   Constitutional:       General: She is not in acute distress.     Appearance: Normal appearance.   HENT:      Head: Normocephalic and atraumatic.   Cardiovascular:      Rate and Rhythm: Regular rhythm. Tachycardia present.      Pulses: Normal pulses.      Heart sounds: No murmur heard.  Pulmonary:      Effort: Pulmonary effort is normal. No respiratory distress.      Breath sounds: Normal breath sounds.   Musculoskeletal:      Right lower leg: No edema.      Left lower leg: No edema.   Neurological:      Mental Status: She is alert and oriented to person, place, and time.      Gait: Gait normal.   Psychiatric:         Behavior: Behavior normal.             Lab Results   Component Value Date/Time    SODIUM 143 04/26/2024 09:22 PM    POTASSIUM 4.2 04/26/2024 09:22 PM    CHLORIDE 108 04/26/2024 09:22 PM    CO2 21 04/26/2024 " "09:22 PM    GLUCOSE 86 04/26/2024 09:22 PM    BUN 11 04/26/2024 09:22 PM    CREATININE 0.65 04/26/2024 09:22 PM      Lab Results   Component Value Date/Time    WBC 13.0 (H) 04/26/2024 09:22 PM    RBC 4.81 04/26/2024 09:22 PM    HEMOGLOBIN 13.6 04/26/2024 09:22 PM    HEMATOCRIT 41.8 04/26/2024 09:22 PM    MCV 86.9 04/26/2024 09:22 PM    MCH 28.3 04/26/2024 09:22 PM    MCHC 32.5 04/26/2024 09:22 PM    MPV 9.8 04/26/2024 09:22 PM    NEUTSPOLYS 65.00 04/26/2024 09:22 PM    LYMPHOCYTES 26.90 04/26/2024 09:22 PM    MONOCYTES 5.70 04/26/2024 09:22 PM    EOSINOPHILS 1.60 04/26/2024 09:22 PM    BASOPHILS 0.50 04/26/2024 09:22 PM      No results found for: \"CHOLSTRLTOT\", \"LDL\", \"HDL\", \"TRIGLYCERIDE\"    Lab Results   Component Value Date/Time    TROPONINT <6 04/26/2024 2122     No results found for: \"NTPROBNP\"    Echocardiogram 7/10/2024  No prior study is available for comparison.   Normal left ventricular systolic function.   No evidence of valvular abnormality based on Doppler evaluation.     Stress Test 7/16/2024   CONCLUSIONS   Negative stress ECG for ischemia.    Exercise capacity fair to good at 6-10 METS.      Assessment:   1. Palpitations  - EKG - Clinic Performed  - propranolol (INDERAL) 10 MG Tab; Take 1 Tablet by mouth 1 time a day as needed (as needed for sympotmatic palpitations).  Dispense: 30 Tablet; Refill: 11    Other orders  - atomoxetine (STRATTERA) 40 MG capsule; Take 40 mg by mouth every morning.        Medical Decision Making:  Today's Assessment / Plan:   Palpitations  Tachycardia  PVCs  -patient reports continued to have daily episodes of racing heart and is symptomatic and severity increasing.   -Start propranolol 10 mg as needed for palpitations  -Patient reports staying hydrated and adding electrolytes into her water. Continue to focus on hydration and lifestyle modification.    Return in about 1 year (around 4/10/2026) for Deann Lay APRN.  Sooner if problems.    LISSET Chaparro. "

## 2025-04-10 ENCOUNTER — OFFICE VISIT (OUTPATIENT)
Dept: CARDIOLOGY | Facility: MEDICAL CENTER | Age: 25
End: 2025-04-10
Payer: COMMERCIAL

## 2025-04-10 ENCOUNTER — PATIENT MESSAGE (OUTPATIENT)
Dept: CARDIOLOGY | Facility: MEDICAL CENTER | Age: 25
End: 2025-04-10

## 2025-04-10 VITALS
HEART RATE: 120 BPM | SYSTOLIC BLOOD PRESSURE: 124 MMHG | OXYGEN SATURATION: 97 % | BODY MASS INDEX: 35.52 KG/M2 | WEIGHT: 221 LBS | DIASTOLIC BLOOD PRESSURE: 82 MMHG | RESPIRATION RATE: 16 BRPM | HEIGHT: 66 IN

## 2025-04-10 DIAGNOSIS — R00.2 PALPITATIONS: ICD-10-CM

## 2025-04-10 LAB — EKG IMPRESSION: NORMAL

## 2025-04-10 PROCEDURE — 99211 OFF/OP EST MAY X REQ PHY/QHP: CPT

## 2025-04-10 PROCEDURE — 93010 ELECTROCARDIOGRAM REPORT: CPT | Performed by: INTERNAL MEDICINE

## 2025-04-10 PROCEDURE — 93005 ELECTROCARDIOGRAM TRACING: CPT | Mod: TC

## 2025-04-10 PROCEDURE — 99214 OFFICE O/P EST MOD 30 MIN: CPT

## 2025-04-10 RX ORDER — ATOMOXETINE 40 MG/1
40 CAPSULE ORAL EVERY MORNING
COMMUNITY
Start: 2025-02-12

## 2025-04-10 RX ORDER — PROPRANOLOL HYDROCHLORIDE 10 MG/1
10 TABLET ORAL
Qty: 90 TABLET | Refills: 3 | Status: SHIPPED | OUTPATIENT
Start: 2025-04-10 | End: 2025-04-24 | Stop reason: SDUPTHER

## 2025-04-10 RX ORDER — PROPRANOLOL HYDROCHLORIDE 10 MG/1
10 TABLET ORAL
Qty: 30 TABLET | Refills: 11 | Status: SHIPPED | OUTPATIENT
Start: 2025-04-10 | End: 2025-04-10

## 2025-04-10 ASSESSMENT — FIBROSIS 4 INDEX: FIB4 SCORE: 0.39

## 2025-04-10 ASSESSMENT — ENCOUNTER SYMPTOMS
ORTHOPNEA: 0
PND: 0
DIZZINESS: 1
LIGHT-HEADEDNESS: 1
SHORTNESS OF BREATH: 1
SYNCOPE: 0
PALPITATIONS: 1
NEAR-SYNCOPE: 0
DYSPNEA ON EXERTION: 0
HEADACHES: 0

## 2025-04-10 NOTE — TELEPHONE ENCOUNTER
Is the patient due for a refill? Yes    Was the patient seen the last 15 months? {YES / NO:65516}    Date of last office visit: ***    Does the patient have an upcoming appointment?  {YES / NO:03828}   If yes, When? ***    Provider to refill:***    Does the patient have [facility] and need 100-day supply? (This applies to ALL medications) {YES/NO/NA:14492}

## 2025-04-24 ENCOUNTER — HOSPITAL ENCOUNTER (OUTPATIENT)
Dept: LAB | Facility: MEDICAL CENTER | Age: 25
End: 2025-04-24
Payer: COMMERCIAL

## 2025-04-24 LAB
BASOPHILS # BLD AUTO: 0.6 % (ref 0–1.8)
BASOPHILS # BLD: 0.05 K/UL (ref 0–0.12)
EOSINOPHIL # BLD AUTO: 0.16 K/UL (ref 0–0.51)
EOSINOPHIL NFR BLD: 2.1 % (ref 0–6.9)
ERYTHROCYTE [DISTWIDTH] IN BLOOD BY AUTOMATED COUNT: 43.1 FL (ref 35.9–50)
HCT VFR BLD AUTO: 44 % (ref 37–47)
HGB BLD-MCNC: 13.9 G/DL (ref 12–16)
IMM GRANULOCYTES # BLD AUTO: 0.02 K/UL (ref 0–0.11)
IMM GRANULOCYTES NFR BLD AUTO: 0.3 % (ref 0–0.9)
LYMPHOCYTES # BLD AUTO: 2.18 K/UL (ref 1–4.8)
LYMPHOCYTES NFR BLD: 28 % (ref 22–41)
MCH RBC QN AUTO: 28.7 PG (ref 27–33)
MCHC RBC AUTO-ENTMCNC: 31.6 G/DL (ref 32.2–35.5)
MCV RBC AUTO: 90.7 FL (ref 81.4–97.8)
MONOCYTES # BLD AUTO: 0.46 K/UL (ref 0–0.85)
MONOCYTES NFR BLD AUTO: 5.9 % (ref 0–13.4)
NEUTROPHILS # BLD AUTO: 4.92 K/UL (ref 1.82–7.42)
NEUTROPHILS NFR BLD: 63.1 % (ref 44–72)
NRBC # BLD AUTO: 0 K/UL
NRBC BLD-RTO: 0 /100 WBC (ref 0–0.2)
PLATELET # BLD AUTO: 400 K/UL (ref 164–446)
PMV BLD AUTO: 10.4 FL (ref 9–12.9)
RBC # BLD AUTO: 4.85 M/UL (ref 4.2–5.4)
WBC # BLD AUTO: 7.8 K/UL (ref 4.8–10.8)

## 2025-04-24 PROCEDURE — 84439 ASSAY OF FREE THYROXINE: CPT

## 2025-04-24 PROCEDURE — 85025 COMPLETE CBC W/AUTO DIFF WBC: CPT

## 2025-04-24 PROCEDURE — 86376 MICROSOMAL ANTIBODY EACH: CPT

## 2025-04-24 PROCEDURE — 80053 COMPREHEN METABOLIC PANEL: CPT

## 2025-04-24 PROCEDURE — 84481 FREE ASSAY (FT-3): CPT

## 2025-04-24 PROCEDURE — 86800 THYROGLOBULIN ANTIBODY: CPT

## 2025-04-24 PROCEDURE — 80061 LIPID PANEL: CPT

## 2025-04-24 PROCEDURE — 36415 COLL VENOUS BLD VENIPUNCTURE: CPT

## 2025-04-24 PROCEDURE — 84443 ASSAY THYROID STIM HORMONE: CPT

## 2025-04-24 RX ORDER — PROPRANOLOL HCL 20 MG
20 TABLET ORAL PRN
Qty: 180 TABLET | Refills: 1 | Status: SHIPPED | OUTPATIENT
Start: 2025-04-24

## 2025-04-24 NOTE — TELEPHONE ENCOUNTER
LISSET Chaparro. to Me (Selected Message)      4/24/25  9:29 AM   IF patient is tolerating current dose we can increase dose to 20 mg up to twice a day as needed for palpitations and see if that can help lower her blood pressure and improve her palpitations.    Thanks,    Juanjose

## 2025-04-25 LAB
ALBUMIN SERPL BCP-MCNC: 4.4 G/DL (ref 3.2–4.9)
ALBUMIN/GLOB SERPL: 1.4 G/DL
ALP SERPL-CCNC: 106 U/L (ref 30–99)
ALT SERPL-CCNC: 19 U/L (ref 2–50)
ANION GAP SERPL CALC-SCNC: 11 MMOL/L (ref 7–16)
AST SERPL-CCNC: 19 U/L (ref 12–45)
BILIRUB SERPL-MCNC: 0.3 MG/DL (ref 0.1–1.5)
BUN SERPL-MCNC: 10 MG/DL (ref 8–22)
CALCIUM ALBUM COR SERPL-MCNC: 9.1 MG/DL (ref 8.5–10.5)
CALCIUM SERPL-MCNC: 9.4 MG/DL (ref 8.5–10.5)
CHLORIDE SERPL-SCNC: 107 MMOL/L (ref 96–112)
CHOLEST SERPL-MCNC: 124 MG/DL (ref 100–199)
CO2 SERPL-SCNC: 21 MMOL/L (ref 20–33)
CREAT SERPL-MCNC: 0.81 MG/DL (ref 0.5–1.4)
FASTING STATUS PATIENT QL REPORTED: NORMAL
GFR SERPLBLD CREATININE-BSD FMLA CKD-EPI: 103 ML/MIN/1.73 M 2
GLOBULIN SER CALC-MCNC: 3.2 G/DL (ref 1.9–3.5)
GLUCOSE SERPL-MCNC: 75 MG/DL (ref 65–99)
HDLC SERPL-MCNC: 45 MG/DL
LDLC SERPL CALC-MCNC: 66 MG/DL
POTASSIUM SERPL-SCNC: 3.9 MMOL/L (ref 3.6–5.5)
PROT SERPL-MCNC: 7.6 G/DL (ref 6–8.2)
SODIUM SERPL-SCNC: 139 MMOL/L (ref 135–145)
T3FREE SERPL-MCNC: 3.39 PG/ML (ref 2–4.4)
T4 FREE SERPL-MCNC: 1.33 NG/DL (ref 0.93–1.7)
THYROPEROXIDASE AB SERPL-ACNC: 14.3 IU/ML (ref 0–9)
TRIGL SERPL-MCNC: 63 MG/DL (ref 0–149)
TSH SERPL-ACNC: 1.59 UIU/ML (ref 0.38–5.33)

## 2025-04-26 LAB
THYROGLOB AB SERPL-ACNC: <1.5 IU/ML (ref 0–4)
THYROPEROXIDASE AB SERPL-ACNC: <0.3 IU/ML (ref 0–9)

## 2025-05-07 ENCOUNTER — PATIENT MESSAGE (OUTPATIENT)
Dept: CARDIOLOGY | Facility: MEDICAL CENTER | Age: 25
End: 2025-05-07
Payer: COMMERCIAL

## 2025-05-07 ENCOUNTER — APPOINTMENT (OUTPATIENT)
Dept: LAB | Facility: MEDICAL CENTER | Age: 25
End: 2025-05-07
Payer: COMMERCIAL

## 2025-05-07 DIAGNOSIS — Q87.40 MARFAN SYNDROME, UNSPECIFIED: ICD-10-CM

## 2025-05-18 ENCOUNTER — OFFICE VISIT (OUTPATIENT)
Dept: URGENT CARE | Facility: CLINIC | Age: 25
End: 2025-05-18
Payer: COMMERCIAL

## 2025-05-18 VITALS
RESPIRATION RATE: 12 BRPM | HEART RATE: 101 BPM | DIASTOLIC BLOOD PRESSURE: 92 MMHG | OXYGEN SATURATION: 98 % | TEMPERATURE: 97.1 F | HEIGHT: 66 IN | WEIGHT: 216 LBS | BODY MASS INDEX: 34.72 KG/M2 | SYSTOLIC BLOOD PRESSURE: 138 MMHG

## 2025-05-18 DIAGNOSIS — I15.8 REBOUND HYPERTENSION: ICD-10-CM

## 2025-05-18 DIAGNOSIS — R07.1 CHEST PAIN ON BREATHING: Primary | ICD-10-CM

## 2025-05-18 PROCEDURE — 3080F DIAST BP >= 90 MM HG: CPT

## 2025-05-18 PROCEDURE — 3075F SYST BP GE 130 - 139MM HG: CPT

## 2025-05-18 PROCEDURE — 93000 ELECTROCARDIOGRAM COMPLETE: CPT

## 2025-05-18 PROCEDURE — 99214 OFFICE O/P EST MOD 30 MIN: CPT

## 2025-05-18 ASSESSMENT — ENCOUNTER SYMPTOMS
EYE REDNESS: 0
FEVER: 0
BRUISES/BLEEDS EASILY: 0
CONSTIPATION: 0
EYE DISCHARGE: 0
BLOOD IN STOOL: 0
COUGH: 0
HYPERTENSION: 1
VOMITING: 0
WHEEZING: 0
DIARRHEA: 0

## 2025-05-18 ASSESSMENT — FIBROSIS 4 INDEX: FIB4 SCORE: 0.27

## 2025-05-18 NOTE — PROGRESS NOTES
"Subjective:     Zhanna Edmondson is a 24 y.o. female who presents for Hypertension (Tachy and hypertension, chest px and abd px.)      Hypertension  Associated symptoms include chest pain.       Review of Systems   Constitutional:  Negative for fever.   HENT:  Negative for congestion and ear discharge.    Eyes:  Negative for discharge and redness.   Respiratory:  Negative for cough and wheezing.    Cardiovascular:  Positive for chest pain.   Gastrointestinal:  Negative for blood in stool, constipation, diarrhea and vomiting.   Genitourinary:  Negative for frequency and hematuria.   Skin:  Negative for itching and rash.   Endo/Heme/Allergies:  Does not bruise/bleed easily.        CURRENT MEDICATIONS:  atomoxetine  buPROPion  D3 VITAMIN PO  escitalopram  propranolol Tabs    Allergies:   Allergies[1]    Current Problems: Zhanna Edmondson does not have any pertinent problems on file.  Past Surgical Hx:    Past Surgical History:   Procedure Laterality Date    ZEKE BY LAPAROSCOPY N/A 1/14/2023    Procedure: CHOLECYSTECTOMY, LAPAROSCOPIC;  Surgeon: Inocencia Carroll M.D.;  Location: SURGERY [facility];  Service: General      Past Social Hx:  reports that she has never smoked. She has never used smokeless tobacco. She reports current alcohol use. She reports that she does not use drugs.   Past Family Hx:  Zhanna Edmondson family history includes Cancer in her maternal grandfather; Diabetes in her paternal grandfather; Hypertension in her father; Lung Disease in her maternal grandfather.     (Allergies, Medications, & Tobacco/Substance Use were reconciled by the Medical Assistant and reviewed by myself. The family history is prepopulated)       Objective:     BP (!) 138/92   Pulse (!) 101   Temp 36.2 °C (97.1 °F) (Temporal)   Resp 12   Ht 1.676 m (5' 6\")   Wt 98 kg (216 lb)   SpO2 98%   BMI 34.86 kg/m²     Physical Exam  Constitutional:       General: She is not in acute distress.     " Appearance: Normal appearance. She is not ill-appearing, toxic-appearing or diaphoretic.   HENT:      Head: Normocephalic and atraumatic.      Nose: Nose normal.   Eyes:      General: No scleral icterus.        Right eye: No discharge.         Left eye: No discharge.   Cardiovascular:      Rate and Rhythm: Regular rhythm. Tachycardia present.      Heart sounds: Normal heart sounds. No murmur heard.     No friction rub. No gallop.   Pulmonary:      Effort: Pulmonary effort is normal. No respiratory distress.      Breath sounds: No stridor. No wheezing, rhonchi or rales.   Chest:      Chest wall: No tenderness.   Abdominal:      General: Abdomen is flat.      Palpations: Abdomen is soft.   Musculoskeletal:         General: Normal range of motion.      Cervical back: No rigidity.   Skin:     General: Skin is warm and dry.   Neurological:      General: No focal deficit present.      Mental Status: She is alert and oriented to person, place, and time. Mental status is at baseline.   Psychiatric:         Behavior: Behavior normal.         Judgment: Judgment normal.       Assessment/Plan:     Vinny was seen today for hypertension.    Diagnoses and all orders for this visit:    Chest pain on breathing  -     EKG - Clinic Performed    Rebound hypertension    Based on history of presenting illness, review of systems and physical exam findings, most likely etiology of chest pain is unknown however appears to be noncardiac and is worsened with deep breath.  No crackles or muffled heart sounds.  EKG is reassuring reveals sinus rhythm and rate.  Patient states she has a CT heart scheduled for this week secondary to her history of Marfan's.  Advised to continue with that appointment.  In addition patient currently taking propranolol, 40 mg a day, states she missed her second dose, sinus tachycardia may be secondary to rebound.  Advised to continue taking medications as prescribed.  Physical exam and cardiopulmonary exam is  reassuring.  Strict return and ED precautions were discussed and all questions were answered.      Differential diagnosis, natural history, supportive care, and indications for immediate follow-up discussed.    Advised the patient to follow-up with the primary care physician for recheck, reevaluation, and consideration of further management.    Please note that this dictation was created using voice recognition software. I have made reasonable attempt to correct obvious errors, but I expect that there are errors of grammar and possibly content that I did not discover before finalizing the note.    This note was electronically signed by Lupe Orr MD PhD         [1]   Allergies  Allergen Reactions    Skin Adhesives Rash     Tape and bandaids  Tegaderm, ok

## 2025-05-22 ENCOUNTER — HOSPITAL ENCOUNTER (OUTPATIENT)
Dept: RADIOLOGY | Facility: MEDICAL CENTER | Age: 25
End: 2025-05-22
Payer: COMMERCIAL

## 2025-05-22 DIAGNOSIS — Q87.40 MARFAN SYNDROME, UNSPECIFIED: ICD-10-CM

## 2025-05-22 PROCEDURE — 71275 CT ANGIOGRAPHY CHEST: CPT

## 2025-05-22 PROCEDURE — 700117 HCHG RX CONTRAST REV CODE 255

## 2025-05-22 RX ADMIN — IOHEXOL 100 ML: 350 INJECTION, SOLUTION INTRAVENOUS at 10:35

## 2025-05-23 ENCOUNTER — RESULTS FOLLOW-UP (OUTPATIENT)
Dept: CARDIOLOGY | Facility: MEDICAL CENTER | Age: 25
End: 2025-05-23
Payer: COMMERCIAL

## 2025-06-19 ENCOUNTER — APPOINTMENT (OUTPATIENT)
Dept: CARDIOLOGY | Facility: MEDICAL CENTER | Age: 25
End: 2025-06-19
Payer: COMMERCIAL

## 2025-06-20 ENCOUNTER — OFFICE VISIT (OUTPATIENT)
Dept: URGENT CARE | Facility: PHYSICIAN GROUP | Age: 25
End: 2025-06-20
Payer: COMMERCIAL

## 2025-06-20 VITALS
HEIGHT: 66 IN | WEIGHT: 223.33 LBS | SYSTOLIC BLOOD PRESSURE: 118 MMHG | OXYGEN SATURATION: 97 % | DIASTOLIC BLOOD PRESSURE: 78 MMHG | HEART RATE: 104 BPM | TEMPERATURE: 97.6 F | BODY MASS INDEX: 35.89 KG/M2 | RESPIRATION RATE: 14 BRPM

## 2025-06-20 DIAGNOSIS — R07.9 CHEST PAIN, UNSPECIFIED TYPE: ICD-10-CM

## 2025-06-20 DIAGNOSIS — R00.2 PALPITATIONS: Primary | ICD-10-CM

## 2025-06-20 PROCEDURE — 3078F DIAST BP <80 MM HG: CPT | Performed by: NURSE PRACTITIONER

## 2025-06-20 PROCEDURE — 99214 OFFICE O/P EST MOD 30 MIN: CPT | Performed by: NURSE PRACTITIONER

## 2025-06-20 PROCEDURE — 3074F SYST BP LT 130 MM HG: CPT | Performed by: NURSE PRACTITIONER

## 2025-06-20 ASSESSMENT — FIBROSIS 4 INDEX: FIB4 SCORE: 0.27

## 2025-06-20 NOTE — PROGRESS NOTES
Verbal consent was acquired by the patient to use [employer] ambient listening note generation during this visit          Chief Complaint   Patient presents with    Heart Problem     180HR 172/122 BP, Pt has Heart Condition          History of Present Illness  The patient is a 24-year-old female who presents for evaluation of chest fluttering, lightheadedness, and elevated blood pressure.    She experienced a significant episode this morning while taking her dogs out, during which she felt extremely lightheaded and reported a sensation of chest fluttering. Her heart rate was recorded at approximately 180 to 181 beats per minute. On her way to work, she used her personal blood pressure monitor, which indicated a reading of 176/122. Subsequently, she observed a rapid decrease in both her heart rate and oxygen saturation level, the latter dropping to around 86 percent. She communicated these findings to her mother, a nurse, who advised her to seek medical attention due to the possibility of atrial fibrillation.    Currently, she reports feeling better but continues to experience chest pain during deep inhalation. She also notes that her symptoms worsen when she is active, such as walking or performing other tasks. She has been monitoring her blood pressure using an arm cuff device and has noticed some improvement since starting propranolol. However, she reports that her diastolic pressure remains elevated, typically in the 90s, and she has observed instances where her systolic pressure is around 110 while her diastolic pressure ranges from 93 to 98.         ROS:    No severe shortness of breath   No cardiac like chest pain, as discussed   As otherwise stated in HPI    Medical/SX/ Social History:  Reviewed per chart    Pertinent Medications:    Medications Ordered Prior to Encounter[1]     Allergies:    Skin adhesives     Problem list, medications, and allergies reviewed by myself today in [facility]     Physical  Exam:    Vitals:    06/20/25 0830   BP: 118/78   Pulse: (!) 104   Resp: 14   Temp: 36.4 °C (97.6 °F)   SpO2: 97%             Physical Exam  Vitals and nursing note reviewed.   Constitutional:       General: She is not in acute distress.     Appearance: Normal appearance. She is not ill-appearing or toxic-appearing.   HENT:      Head: Normocephalic and atraumatic.      Nose: Nose normal.      Mouth/Throat:      Mouth: Mucous membranes are moist.      Pharynx: Oropharynx is clear.   Eyes:      Extraocular Movements: Extraocular movements intact.      Conjunctiva/sclera: Conjunctivae normal.      Pupils: Pupils are equal, round, and reactive to light.   Cardiovascular:      Rate and Rhythm: Normal rate and regular rhythm.      Pulses: Normal pulses.      Heart sounds: Normal heart sounds.   Pulmonary:      Effort: Pulmonary effort is normal. No respiratory distress.      Breath sounds: Normal breath sounds. No stridor. No wheezing, rhonchi or rales.   Chest:      Chest wall: No tenderness.   Musculoskeletal:         General: Normal range of motion.      Cervical back: Normal range of motion and neck supple.   Skin:     General: Skin is warm.      Capillary Refill: Capillary refill takes less than 2 seconds.   Neurological:      General: No focal deficit present.      Mental Status: She is alert and oriented to person, place, and time.            Diagnostics:    EKG Interpretation   Ordered and interpreted by LAYTON Kemp  Rhythm: normal sinus   Rate: 85 normal   Axis: normal   Ectopy: none   Conduction: normal   ST Segments: no acute change   T Waves: no acute change   Q Waves: none   Clinical Impression: no acute changes and normal EKG      Medical Decision making and plan :  I personally reviewed prior external notes and test results pertinent to today's visit. Pt is clinically stable at today's acute urgent care visit.  Patient appears nontoxic with no acute distress noted. Appropriate for outpatient care at  this time.    Pleasant 24 y.o. female presented clinic with:     Assessment & Plan  1. Chest fluttering and lightheadedness.  - Experienced significant lightheadedness and chest fluttering with a heart rate of 180-181 bpm and a blood pressure of 176/122 mmHg at home.  - SpO2 dropped to 86%.  - Patient feeling better now, and now has normalized vital signs.   - An EKG was normal in clinic today.  - Patient will well documented history of similar symptoms and extensive workup by cardiology.  All results have been reassuring.  She has recent diagnosis of Marfan's which could be contributing as well.  - Patient was advised due to her complicated history that ER is best and safest place for her to be evaluated for any symptoms of this nature, and she agrees.  She was referred to ER for further workup if she develops any new, persistent or worsening symptoms.      2. Elevated blood pressure.  - Blood pressure readings have been high, with diastolic pressures usually in the 90s.  - Currently on propranolol, which has provided some improvement.  - Advised to continue monitoring her blood pressure at home using her arm cuff device.  - Follow with cardiology.    Shared decision-making was utilized with patient for treatment plan. Medication discussed included indication for use and the potential benefits and side effects. Education was provided regarding the aforementioned assessments.  Differential Diagnosis, natural history, and supportive care discussed. All of the patient's questions were answered to their satisfaction at the time of discharge. Patient was encouraged to monitor symptoms closely. Those signs and symptoms which would warrant concern and mandate seeking a higher level of service through the emergency department discussed at length.  Patient stated agreement and understanding of this plan of care.    Disposition:  Home in stable condition     Voice Recognition Disclaimer:  Portions of this document were  created using voice recognition software and [employer] technology provided by [facility]. The software does have a chance of producing errors of grammar and possibly content. I have made every reasonable attempt to correct obvious errors, but there may be errors of grammar and possibly content that I did not discover before finalizing the  documentation.    NAOMY Cordero        [1]   Current Outpatient Medications on File Prior to Visit   Medication Sig Dispense Refill    propranolol (INDERAL) 20 MG Tab Take 1 Tablet by mouth as needed (May take 1 tablet up to twice a day as needed for palpations.). 180 Tablet 1    atomoxetine (STRATTERA) 40 MG capsule Take 40 mg by mouth every morning.      buPROPion (WELLBUTRIN XL) 150 MG XL tablet  (Patient taking differently: 300 mg. Patient reported taking 300 once a day.)      escitalopram (LEXAPRO) 20 MG tablet Take 20 mg by mouth every day.      Cholecalciferol (D3 VITAMIN PO) Take  by mouth. Pt taking 1500 units daily       No current facility-administered medications on file prior to visit.

## 2025-06-20 NOTE — LETTER
June 20, 2025       Patient: Zhanna Edmondson   YOB: 2000   Date of Visit: 6/20/2025         To Whom It May Concern:    In my medical opinion, I recommend that Zhanna Edmondson be excused from work today due to illness.    If you have any questions or concerns, please don't hesitate to call [phone]          Sincerely,          Osiris Suarez A.P.R.N.  Electronically Signed

## 2025-07-10 ENCOUNTER — OFFICE VISIT (OUTPATIENT)
Dept: CARDIOLOGY | Facility: MEDICAL CENTER | Age: 25
End: 2025-07-10
Payer: COMMERCIAL

## 2025-07-10 VITALS
RESPIRATION RATE: 16 BRPM | WEIGHT: 221 LBS | SYSTOLIC BLOOD PRESSURE: 118 MMHG | DIASTOLIC BLOOD PRESSURE: 82 MMHG | BODY MASS INDEX: 35.52 KG/M2 | HEIGHT: 66 IN | OXYGEN SATURATION: 100 % | HEART RATE: 82 BPM

## 2025-07-10 DIAGNOSIS — I49.3 PVC'S (PREMATURE VENTRICULAR CONTRACTIONS): ICD-10-CM

## 2025-07-10 DIAGNOSIS — R00.2 PALPITATIONS: ICD-10-CM

## 2025-07-10 DIAGNOSIS — G47.33 OSA (OBSTRUCTIVE SLEEP APNEA): ICD-10-CM

## 2025-07-10 DIAGNOSIS — R06.02 SHORTNESS OF BREATH: ICD-10-CM

## 2025-07-10 DIAGNOSIS — R00.2 PALPITATIONS: Primary | ICD-10-CM

## 2025-07-10 PROCEDURE — 99212 OFFICE O/P EST SF 10 MIN: CPT

## 2025-07-10 PROCEDURE — 3079F DIAST BP 80-89 MM HG: CPT

## 2025-07-10 PROCEDURE — 99213 OFFICE O/P EST LOW 20 MIN: CPT

## 2025-07-10 PROCEDURE — 3074F SYST BP LT 130 MM HG: CPT

## 2025-07-10 PROCEDURE — 99214 OFFICE O/P EST MOD 30 MIN: CPT

## 2025-07-10 RX ORDER — BUPROPION HYDROCHLORIDE 300 MG/1
TABLET ORAL
COMMUNITY
Start: 2025-06-30

## 2025-07-10 RX ORDER — GABAPENTIN 300 MG/1
CAPSULE ORAL
COMMUNITY
Start: 2025-06-30

## 2025-07-10 RX ORDER — CYCLOBENZAPRINE HCL 10 MG
10 TABLET ORAL 2 TIMES DAILY PRN
COMMUNITY

## 2025-07-10 RX ORDER — DILTIAZEM HYDROCHLORIDE 30 MG/1
30 TABLET, FILM COATED ORAL
Qty: 60 TABLET | Refills: 11 | Status: SHIPPED | OUTPATIENT
Start: 2025-07-10 | End: 2025-07-24 | Stop reason: SDUPTHER

## 2025-07-10 RX ORDER — ESCITALOPRAM OXALATE 20 MG/1
TABLET ORAL
COMMUNITY
Start: 2024-08-09

## 2025-07-10 ASSESSMENT — ENCOUNTER SYMPTOMS
SYNCOPE: 0
DYSPNEA ON EXERTION: 0
PALPITATIONS: 1
LIGHT-HEADEDNESS: 1
DIZZINESS: 1
PND: 0
NEAR-SYNCOPE: 0
ORTHOPNEA: 0
HEADACHES: 0
SHORTNESS OF BREATH: 1

## 2025-07-10 ASSESSMENT — FIBROSIS 4 INDEX: FIB4 SCORE: 0.27

## 2025-07-10 NOTE — PROGRESS NOTES
Chief Complaint   Patient presents with    Palpitations     Follow up           Subjective:   Zhanna Edmondson is a 24 y.o. female who presents today for follow-up.     Patient of Dr. Dickson.  Current medical problems include chest pain, palpitations. Their last clinic visit was 4/10/2025 with myself    4/10/2025 visit:  Reports that since her last follow-up with Dr. Dickson she is continue to have almost daily palpitations.  They range from 10 minutes to 30 minutes in duration.  However just last week she had an episode where she could feel her heart racing she was just sitting at the end of the day she was short of breath her blood pressure elevated and she is very symptomatic.  She did take 5 mg of propranolol during that episode which helped decrease her heart rate.  She does report dizziness and lightheadedness with these episodes.  She denies syncope, orthopnea, PND, edema, or syncope.  Reports having low-grade intermittent fevers since November and is having a workup seeing a specialist later today.    Today's visit:  Patient reports that since her last visit she has tried the propranolol but it is causing severe fatigue and when she takes it she feels like she has to go to bed immediately.  She reports having new symptoms since her last follow-up.  She says maybe once a week she gets this feeling of shortness of breath at rest where she feels like she is choking has to cough and within a minute will resolve is not associated with any palpitations or hypertension.  She reports her blood pressure has been elevated and went to urgent care recently but then again it resolved without any intervention.  She said when she has elevated blood pressure does feel some jaw pain.  She does also report pain with deep inspiration which is reproducible when palpating on the left side of her chest.  She denies orthopnea, PND, edema, syncope.     Cardiovascular Risk Factors:  1. Smoking status: Never  2. Type II Diabetes  "Mellitus: No No results found for: \"HBA1C\"  3. Hypertension: No  4. Dyslipidemia: No No results found for: \"CHOLSTRLTOT\", \"LDL\", \"HDL\", \"TRIGLYCERIDE\"    Past Medical History:   Diagnosis Date    Anxiety     Depression          Family History   Problem Relation Age of Onset    Lung Disease Maternal Grandfather     Cancer Maternal Grandfather     Diabetes Paternal Grandfather     Hypertension Father          Social History     Tobacco Use    Smoking status: Never    Smokeless tobacco: Never   Vaping Use    Vaping status: Never Used   Substance Use Topics    Alcohol use: Yes     Comment: occ.    Drug use: Never         Allergies   Allergen Reactions    Skin Adhesives Rash     Tape and bandaids  Tegaderm, ok         Current Outpatient Medications   Medication Sig    gabapentin (NEURONTIN) 300 MG Cap     buPROPion (WELLBUTRIN XL) 300 MG XL tablet     dilTIAZem (CARDIZEM) 30 MG Tab Take 1 Tablet by mouth 2 (two) times a day.    atomoxetine (STRATTERA) 40 MG capsule Take 40 mg by mouth every morning.    escitalopram (LEXAPRO) 20 MG tablet Take 20 mg by mouth every day.    Cholecalciferol (D3 VITAMIN PO) Take  by mouth. Pt taking 1500 units daily    cyclobenzaprine (FLEXERIL) 10 MG Tab Take 10 mg by mouth 2 times a day as needed. (Patient not taking: Reported on 7/10/2025)    escitalopram (LEXAPRO) 20 MG tablet Escitalopram Oxalate 20 MG Oral Tablet QTY: 30 tablet Days: 30 Refills: 0  Written: 08/09/24 Patient Instructions:         Review of Systems   Constitutional: Negative for malaise/fatigue.   Cardiovascular:  Positive for palpitations. Negative for chest pain, dyspnea on exertion, leg swelling, near-syncope, orthopnea, paroxysmal nocturnal dyspnea and syncope.   Respiratory:  Positive for shortness of breath.    Neurological:  Positive for dizziness and light-headedness. Negative for headaches.           Objective:   /82 (BP Location: Left arm, Patient Position: Sitting)   Pulse 82   Resp 16   Ht 1.676 m (5' " "6\")   Wt 100 kg (221 lb)   SpO2 100%  Body mass index is 35.67 kg/m².         Physical Exam  Vitals reviewed.   Constitutional:       General: She is not in acute distress.     Appearance: Normal appearance.   HENT:      Head: Normocephalic and atraumatic.   Cardiovascular:      Rate and Rhythm: Normal rate and regular rhythm.      Pulses: Normal pulses.      Heart sounds: No murmur heard.  Pulmonary:      Effort: Pulmonary effort is normal. No respiratory distress.      Breath sounds: Normal breath sounds.   Musculoskeletal:      Right lower leg: No edema.      Left lower leg: No edema.   Neurological:      Mental Status: She is alert and oriented to person, place, and time.      Gait: Gait normal.   Psychiatric:         Behavior: Behavior normal.             Lab Results   Component Value Date/Time    SODIUM 139 04/24/2025 12:54 PM    POTASSIUM 3.9 04/24/2025 12:54 PM    CHLORIDE 107 04/24/2025 12:54 PM    CO2 21 04/24/2025 12:54 PM    GLUCOSE 75 04/24/2025 12:54 PM    BUN 10 04/24/2025 12:54 PM    CREATININE 0.81 04/24/2025 12:54 PM      Lab Results   Component Value Date/Time    WBC 7.8 04/24/2025 12:54 PM    RBC 4.85 04/24/2025 12:54 PM    HEMOGLOBIN 13.9 04/24/2025 12:54 PM    HEMATOCRIT 44.0 04/24/2025 12:54 PM    MCV 90.7 04/24/2025 12:54 PM    MCH 28.7 04/24/2025 12:54 PM    MCHC 31.6 (L) 04/24/2025 12:54 PM    MPV 10.4 04/24/2025 12:54 PM    NEUTSPOLYS 63.10 04/24/2025 12:54 PM    LYMPHOCYTES 28.00 04/24/2025 12:54 PM    MONOCYTES 5.90 04/24/2025 12:54 PM    EOSINOPHILS 2.10 04/24/2025 12:54 PM    BASOPHILS 0.60 04/24/2025 12:54 PM      Lab Results   Component Value Date/Time    CHOLSTRLTOT 124 04/24/2025 12:54 PM    LDL 66 04/24/2025 12:54 PM    HDL 45 04/24/2025 12:54 PM    TRIGLYCERIDE 63 04/24/2025 12:54 PM       Lab Results   Component Value Date/Time    TROPONINT <6 04/26/2024 2122     No results found for: \"NTPROBNP\"    Echocardiogram 7/10/2024  No prior study is available for comparison.   Normal " left ventricular systolic function.   No evidence of valvular abnormality based on Doppler evaluation.     Stress Test 7/16/2024   CONCLUSIONS   Negative stress ECG for ischemia.    Exercise capacity fair to good at 6-10 METS.      Assessment:   1. Palpitations  - dilTIAZem (CARDIZEM) 30 MG Tab; Take 1 Tablet by mouth 2 (two) times a day.  Dispense: 60 Tablet; Refill: 11  - PULMONARY FUNCTION TESTS -Test requested: Complete Pulmonary Function Test; Future    2. PVC's (premature ventricular contractions)  - dilTIAZem (CARDIZEM) 30 MG Tab; Take 1 Tablet by mouth 2 (two) times a day.  Dispense: 60 Tablet; Refill: 11  - PULMONARY FUNCTION TESTS -Test requested: Complete Pulmonary Function Test; Future    3. MINOR (obstructive sleep apnea)  - PULMONARY FUNCTION TESTS -Test requested: Complete Pulmonary Function Test; Future    4. Shortness of breath    Other orders  - cyclobenzaprine (FLEXERIL) 10 MG Tab; Take 10 mg by mouth 2 times a day as needed. (Patient not taking: Reported on 7/10/2025)  - gabapentin (NEURONTIN) 300 MG Cap  - buPROPion (WELLBUTRIN XL) 300 MG XL tablet  - escitalopram (LEXAPRO) 20 MG tablet; Escitalopram Oxalate 20 MG Oral Tablet QTY: 30 tablet Days: 30 Refills: 0  Written: 08/09/24 Patient Instructions:          Medical Decision Making:  Today's Assessment / Plan:   Palpitations  Tachycardia  PVCs  -patient reports continued to have daily episodes of racing heart and is symptomatic but not tolerating propranolol  -stop propranolol   -start diltiazem 30 mg twice a day for palpitations  -Patient reports staying hydrated and adding electrolytes into her water. Continue to focus on hydration and lifestyle modification.    Shortness of breath  -pulmonary function test ordered to evaluate non cardiac workup for shortness of breath    Return in about 2 weeks (around 7/24/2025) for Deann TRAYLOR.  Sooner if problems.    LISSET Chaparro.

## 2025-07-11 RX ORDER — DILTIAZEM HYDROCHLORIDE 30 MG/1
30 TABLET, FILM COATED ORAL 2 TIMES DAILY
Qty: 180 TABLET | OUTPATIENT
Start: 2025-07-11

## 2025-07-11 NOTE — Clinical Note
REFERRAL APPROVAL NOTICE         Sent on July 11, 2025                   Vinny Edmondson  [address] [address]                   Dear Ms. Edmondson,    After a careful review of the medical information and benefit coverage, [facility] has processed your referral. See below for additional details.    If applicable, you must be actively enrolled with your insurance for coverage of the authorized service. If you have any questions regarding your coverage, please contact your insurance directly.    REFERRAL INFORMATION   Referral #:  [id number]  Referred-To Department    Referred-By Provider:  Pulmonary and Sleep Medicine    NAOMY Chaparro   Pulmonary Rehab [facility]      [address]  [address]  [place] [address]  [phone] [address]  [place] [address]  [phone]    Referral Start Date:  07/10/2025  Referral End Date:   07/10/2026             SCHEDULING  If you do not already have an appointment, please call [phone] to make an appointment.     MORE INFORMATION  If you do not already have a [employer] account, sign up at: [email or web address]  You can access your medical information, make appointments, see lab results, billing information, and more.  If you have questions regarding this referral, please contact  the [facility] Referrals department at:             [phone]. Monday - Friday 8:00AM - 5:00PM.     Sincerely,    [facility]

## 2025-07-24 ENCOUNTER — OFFICE VISIT (OUTPATIENT)
Dept: CARDIOLOGY | Facility: MEDICAL CENTER | Age: 25
End: 2025-07-24
Payer: COMMERCIAL

## 2025-07-24 VITALS
SYSTOLIC BLOOD PRESSURE: 106 MMHG | RESPIRATION RATE: 16 BRPM | BODY MASS INDEX: 34.39 KG/M2 | HEART RATE: 80 BPM | DIASTOLIC BLOOD PRESSURE: 70 MMHG | HEIGHT: 66 IN | OXYGEN SATURATION: 96 % | WEIGHT: 214 LBS

## 2025-07-24 DIAGNOSIS — I49.3 PVC'S (PREMATURE VENTRICULAR CONTRACTIONS): ICD-10-CM

## 2025-07-24 DIAGNOSIS — R06.02 SHORTNESS OF BREATH: ICD-10-CM

## 2025-07-24 DIAGNOSIS — R00.2 PALPITATIONS: ICD-10-CM

## 2025-07-24 DIAGNOSIS — R00.0 TACHYCARDIA: Primary | ICD-10-CM

## 2025-07-24 PROCEDURE — 99212 OFFICE O/P EST SF 10 MIN: CPT

## 2025-07-24 RX ORDER — DILTIAZEM HYDROCHLORIDE 30 MG/1
30 TABLET, FILM COATED ORAL
Qty: 180 TABLET | Refills: 3 | Status: SHIPPED | OUTPATIENT
Start: 2025-07-24

## 2025-07-24 ASSESSMENT — ENCOUNTER SYMPTOMS
HEADACHES: 0
PALPITATIONS: 1
SHORTNESS OF BREATH: 1
LIGHT-HEADEDNESS: 1
NEAR-SYNCOPE: 0
PND: 0
ORTHOPNEA: 0
DIZZINESS: 1
DYSPNEA ON EXERTION: 0
SYNCOPE: 0

## 2025-07-24 ASSESSMENT — FIBROSIS 4 INDEX: FIB4 SCORE: 0.27

## 2025-07-24 NOTE — PROGRESS NOTES
Chief Complaint   Patient presents with    Follow-Up     Palpitations          Subjective:   Zhanna Edmondson is a 24 y.o. female who presents today for follow-up.     Patient of Dr. Dickson.  Current medical problems include chest pain, palpitations. Their last clinic visit was 7/10/2025 with myself    4/10/2025 visit:  Reports that since her last follow-up with Dr. Dickson she is continue to have almost daily palpitations.  They range from 10 minutes to 30 minutes in duration.  However just last week she had an episode where she could feel her heart racing she was just sitting at the end of the day she was short of breath her blood pressure elevated and she is very symptomatic.  She did take 5 mg of propranolol during that episode which helped decrease her heart rate.  She does report dizziness and lightheadedness with these episodes.  She denies syncope, orthopnea, PND, edema, or syncope.  Reports having low-grade intermittent fevers since November and is having a workup seeing a specialist later today.    7/10/2025 visit:  Patient reports that since her last visit she has tried the propranolol but it is causing severe fatigue and when she takes it she feels like she has to go to bed immediately.  She reports having new symptoms since her last follow-up.  She says maybe once a week she gets this feeling of shortness of breath at rest where she feels like she is choking has to cough and within a minute will resolve is not associated with any palpitations or hypertension.  She reports her blood pressure has been elevated and went to urgent care recently but then again it resolved without any intervention.  She said when she has elevated blood pressure does feel some jaw pain.  She does also report pain with deep inspiration which is reproducible when palpating on the left side of her chest.  She denies orthopnea, PND, edema, syncope.     Today's visit:  Patient reports since last follow-up stopping propranolol and  "switching to diltiazem that she is feeling much better.  She reports her symptoms of jaw pain related to high blood pressure have almost completely resolved.  Her chest pain has decreased as well as her palpitations.  She feels like the shortness of breath and possible choking sensation is also improved but still gets that.  She has been monitoring her heart rate and blood pressure and has not had as many episodes of tachycardia and her blood pressure has not been above 160 which she was having prior to this change.  She denies orthopnea, PND, edema, syncope.  She is scheduled for her pulmonary function test next month.    Cardiovascular Risk Factors:  1. Smoking status: Never  2. Type II Diabetes Mellitus: No No results found for: \"HBA1C\"  3. Hypertension: No  4. Dyslipidemia: No No results found for: \"CHOLSTRLTOT\", \"LDL\", \"HDL\", \"TRIGLYCERIDE\"    Past Medical History:   Diagnosis Date    Anxiety     Depression          Family History   Problem Relation Age of Onset    Lung Disease Maternal Grandfather     Cancer Maternal Grandfather     Diabetes Paternal Grandfather     Hypertension Father          Social History     Tobacco Use    Smoking status: Never    Smokeless tobacco: Never   Vaping Use    Vaping status: Never Used   Substance Use Topics    Alcohol use: Yes     Comment: occ.    Drug use: Never         Allergies   Allergen Reactions    Skin Adhesives Rash     Tape and bandaids  Tegaderm, ok         Current Outpatient Medications   Medication Sig    dilTIAZem (CARDIZEM) 30 MG Tab Take 1 Tablet by mouth 2 (two) times a day.    gabapentin (NEURONTIN) 300 MG Cap     buPROPion (WELLBUTRIN XL) 300 MG XL tablet     escitalopram (LEXAPRO) 20 MG tablet Escitalopram Oxalate 20 MG Oral Tablet QTY: 30 tablet Days: 30 Refills: 0  Written: 08/09/24 Patient Instructions:    atomoxetine (STRATTERA) 40 MG capsule Take 40 mg by mouth every morning.    escitalopram (LEXAPRO) 20 MG tablet Take 20 mg by mouth every day.    " "Cholecalciferol (D3 VITAMIN PO) Take  by mouth. Pt taking 1500 units daily    cyclobenzaprine (FLEXERIL) 10 MG Tab Take 10 mg by mouth 2 times a day as needed. (Patient not taking: Reported on 7/24/2025)         Review of Systems   Constitutional: Negative for malaise/fatigue.   Cardiovascular:  Positive for palpitations. Negative for chest pain, dyspnea on exertion, leg swelling, near-syncope, orthopnea, paroxysmal nocturnal dyspnea and syncope.   Respiratory:  Positive for shortness of breath.    Neurological:  Positive for dizziness and light-headedness. Negative for headaches.           Objective:   /70 (BP Location: Left arm, Patient Position: Sitting, BP Cuff Size: Adult)   Pulse 80   Resp 16   Ht 1.676 m (5' 6\")   Wt 97.1 kg (214 lb)   SpO2 96%  Body mass index is 34.54 kg/m².         Physical Exam  Vitals reviewed.   Constitutional:       General: She is not in acute distress.     Appearance: Normal appearance.   HENT:      Head: Normocephalic and atraumatic.   Cardiovascular:      Rate and Rhythm: Normal rate and regular rhythm.      Pulses: Normal pulses.      Heart sounds: No murmur heard.  Pulmonary:      Effort: Pulmonary effort is normal. No respiratory distress.      Breath sounds: Normal breath sounds.   Musculoskeletal:      Right lower leg: No edema.      Left lower leg: No edema.   Neurological:      Mental Status: She is alert and oriented to person, place, and time.      Gait: Gait normal.   Psychiatric:         Behavior: Behavior normal.             Lab Results   Component Value Date/Time    SODIUM 139 04/24/2025 12:54 PM    POTASSIUM 3.9 04/24/2025 12:54 PM    CHLORIDE 107 04/24/2025 12:54 PM    CO2 21 04/24/2025 12:54 PM    GLUCOSE 75 04/24/2025 12:54 PM    BUN 10 04/24/2025 12:54 PM    CREATININE 0.81 04/24/2025 12:54 PM      Lab Results   Component Value Date/Time    WBC 7.8 04/24/2025 12:54 PM    RBC 4.85 04/24/2025 12:54 PM    HEMOGLOBIN 13.9 04/24/2025 12:54 PM    HEMATOCRIT " "44.0 04/24/2025 12:54 PM    MCV 90.7 04/24/2025 12:54 PM    MCH 28.7 04/24/2025 12:54 PM    MCHC 31.6 (L) 04/24/2025 12:54 PM    MPV 10.4 04/24/2025 12:54 PM    NEUTSPOLYS 63.10 04/24/2025 12:54 PM    LYMPHOCYTES 28.00 04/24/2025 12:54 PM    MONOCYTES 5.90 04/24/2025 12:54 PM    EOSINOPHILS 2.10 04/24/2025 12:54 PM    BASOPHILS 0.60 04/24/2025 12:54 PM      Lab Results   Component Value Date/Time    CHOLSTRLTOT 124 04/24/2025 12:54 PM    LDL 66 04/24/2025 12:54 PM    HDL 45 04/24/2025 12:54 PM    TRIGLYCERIDE 63 04/24/2025 12:54 PM       Lab Results   Component Value Date/Time    TROPONINT <6 04/26/2024 2122     No results found for: \"NTPROBNP\"    Echocardiogram 7/10/2024  No prior study is available for comparison.   Normal left ventricular systolic function.   No evidence of valvular abnormality based on Doppler evaluation.     Stress Test 7/16/2024   CONCLUSIONS   Negative stress ECG for ischemia.    Exercise capacity fair to good at 6-10 METS.      Assessment:   1. Palpitations  - dilTIAZem (CARDIZEM) 30 MG Tab; Take 1 Tablet by mouth 2 (two) times a day.  Dispense: 180 Tablet; Refill: 3    2. PVC's (premature ventricular contractions)  - dilTIAZem (CARDIZEM) 30 MG Tab; Take 1 Tablet by mouth 2 (two) times a day.  Dispense: 180 Tablet; Refill: 3    3. Tachycardia    4. Shortness of breath            Medical Decision Making:  Today's Assessment / Plan:   Palpitations  Tachycardia  PVCs  -Patient reports great improvement of symptoms over the last 2 weeks and tolerating diltiazem  -continue diltiazem 30 mg twice a day for palpitations  -Patient reports staying hydrated and adding electrolytes into her water. Continue to focus on hydration and lifestyle modification.    Shortness of breath  -pulmonary function test ordered to evaluate non cardiac workup for shortness of breath and patient is scheduled next month    Return in about 3 months (around 10/24/2025) for Deann Rosanne APRN.  Sooner if problems.    Deann " [place]

## 2025-08-08 ENCOUNTER — PATIENT MESSAGE (OUTPATIENT)
Dept: CARDIOLOGY | Facility: MEDICAL CENTER | Age: 25
End: 2025-08-08
Payer: COMMERCIAL

## 2025-08-11 ENCOUNTER — PATIENT MESSAGE (OUTPATIENT)
Dept: CARDIOLOGY | Facility: MEDICAL CENTER | Age: 25
End: 2025-08-11
Payer: COMMERCIAL

## 2025-08-28 ENCOUNTER — HOSPITAL ENCOUNTER (OUTPATIENT)
Dept: PULMONOLOGY | Facility: MEDICAL CENTER | Age: 25
End: 2025-08-28
Payer: COMMERCIAL

## 2025-08-28 DIAGNOSIS — G47.33 OSA (OBSTRUCTIVE SLEEP APNEA): ICD-10-CM

## 2025-08-28 DIAGNOSIS — R00.2 PALPITATIONS: ICD-10-CM

## 2025-08-28 DIAGNOSIS — I49.3 PVC'S (PREMATURE VENTRICULAR CONTRACTIONS): ICD-10-CM

## 2025-08-28 PROCEDURE — 94060 EVALUATION OF WHEEZING: CPT

## 2025-08-28 PROCEDURE — 94726 PLETHYSMOGRAPHY LUNG VOLUMES: CPT

## 2025-08-28 PROCEDURE — 94729 DIFFUSING CAPACITY: CPT

## 2025-08-28 RX ORDER — ALBUTEROL SULFATE 5 MG/ML
2.5 SOLUTION RESPIRATORY (INHALATION)
Status: DISCONTINUED | OUTPATIENT
Start: 2025-08-28 | End: 2025-08-29 | Stop reason: HOSPADM

## 2025-08-28 RX ADMIN — ALBUTEROL SULFATE 2.5 MG: 5 SOLUTION RESPIRATORY (INHALATION) at 09:01

## (undated) DEVICE — SUTURE 0 VICRYL PLUS CT-2 - 27 INCH (36/BX)

## (undated) DEVICE — GLOVE, LITE (PAIR)

## (undated) DEVICE — TUBE CONNECTING SUCTION - CLEAR PLASTIC STERILE 72 IN (50EA/CA)

## (undated) DEVICE — CANNULA W/SEAL 5X100 Z-THRE - ADED KII (12/BX)

## (undated) DEVICE — CLIP MED LG INTNL HRZN TI ESCP - (20/BX)

## (undated) DEVICE — SUTURE 4-0 VICRYL PLUS FS-2 - 27 INCH (36/BX)

## (undated) DEVICE — NEEDLE INSFL 120MM 14GA VRRS - (20/BX)

## (undated) DEVICE — Device

## (undated) DEVICE — TOWEL STOP TIMEOUT SAFETY FLAG (40EA/CA)

## (undated) DEVICE — SPONGE GAUZESTER. 2X2 4-PL - (2/PK 50PK/BX 30BX/CS)

## (undated) DEVICE — SUTURE 0 COATED VICRYL 6-18IN - (12PK/BX)

## (undated) DEVICE — SYRINGE 30 ML LL (56/BX)

## (undated) DEVICE — CANNULA W/SEAL11X100ZTHREAD - (12/BX)

## (undated) DEVICE — GLOVE BIOGEL SZ 6.5 SURGICAL PF LTX (50PR/BX 4BX/CA)

## (undated) DEVICE — TUBING FILTER STRYKER

## (undated) DEVICE — SET SUCTION/IRRIGATION WITH DISPOSABLE TIP (6/CA )PART #0250-070-520 IS A SUB

## (undated) DEVICE — SCISSORS 5MM CVD (6EA/BX)

## (undated) DEVICE — SUTURE 0 VICRYL PLUS UR-6 - 27 INCH (36/BX)

## (undated) DEVICE — ELECTRODE 5MM LHK LAPSCP STERILE DISP- MEGADYNE  (5/CA)

## (undated) DEVICE — SUTURE GENERAL

## (undated) DEVICE — TUBING INSUFFLATION - (10/BX)

## (undated) DEVICE — TROCAR Z THREAD11MM OPTICAL - NON BLADED(6/BX)

## (undated) DEVICE — SODIUM CHL IRRIGATION 0.9% 1000ML (12EA/CA)

## (undated) DEVICE — CHLORAPREP 26 ML APPLICATOR - ORANGE TINT(25/CA)

## (undated) DEVICE — DRESSING TRANSPARENT FILM TEGADERM 2.375 X 2.75"  (100EA/BX)"

## (undated) DEVICE — GLOVE BIOGEL INDICATOR SZ 7SURGICAL PF LTX - (50/BX 4BX/CA)

## (undated) DEVICE — TROCAR 5X100 NON BLADED Z-TH - READ KII (6/BX)

## (undated) DEVICE — BAG RETRIEVAL 10ML (10EA/BX)